# Patient Record
Sex: MALE | Race: WHITE | NOT HISPANIC OR LATINO | Employment: FULL TIME | URBAN - METROPOLITAN AREA
[De-identification: names, ages, dates, MRNs, and addresses within clinical notes are randomized per-mention and may not be internally consistent; named-entity substitution may affect disease eponyms.]

---

## 2018-09-28 ENCOUNTER — OFFICE VISIT (OUTPATIENT)
Dept: FAMILY MEDICINE CLINIC | Facility: CLINIC | Age: 63
End: 2018-09-28

## 2018-09-28 ENCOUNTER — TELEPHONE (OUTPATIENT)
Dept: FAMILY MEDICINE CLINIC | Facility: CLINIC | Age: 63
End: 2018-09-28

## 2018-09-28 VITALS
OXYGEN SATURATION: 99 % | BODY MASS INDEX: 26.59 KG/M2 | HEART RATE: 71 BPM | DIASTOLIC BLOOD PRESSURE: 90 MMHG | HEIGHT: 74 IN | SYSTOLIC BLOOD PRESSURE: 124 MMHG | WEIGHT: 207.2 LBS | TEMPERATURE: 97.7 F

## 2018-09-28 DIAGNOSIS — Z00.00 ENCOUNTER FOR PREVENTIVE HEALTH EXAMINATION: Primary | ICD-10-CM

## 2018-09-28 LAB
ALBUMIN SERPL-MCNC: 4.2 G/DL (ref 3.5–5.2)
ALBUMIN/GLOB SERPL: 1.5 G/DL
ALP SERPL-CCNC: 93 U/L (ref 39–117)
ALT SERPL-CCNC: 19 U/L (ref 1–41)
AST SERPL-CCNC: 19 U/L (ref 1–40)
BASOPHILS # BLD AUTO: 0.01 10*3/MM3 (ref 0–0.2)
BASOPHILS NFR BLD AUTO: 0.2 % (ref 0–1.5)
BILIRUB SERPL-MCNC: 0.7 MG/DL (ref 0.1–1.2)
BUN SERPL-MCNC: 14 MG/DL (ref 8–23)
BUN/CREAT SERPL: 15.2 (ref 7–25)
CALCIUM SERPL-MCNC: 9.5 MG/DL (ref 8.6–10.5)
CHLORIDE SERPL-SCNC: 104 MMOL/L (ref 98–107)
CHOLEST SERPL-MCNC: 204 MG/DL (ref 0–200)
CO2 SERPL-SCNC: 26 MMOL/L (ref 22–29)
CREAT SERPL-MCNC: 0.92 MG/DL (ref 0.76–1.27)
EOSINOPHIL # BLD AUTO: 0.05 10*3/MM3 (ref 0–0.7)
EOSINOPHIL NFR BLD AUTO: 1 % (ref 0.3–6.2)
ERYTHROCYTE [DISTWIDTH] IN BLOOD BY AUTOMATED COUNT: 13.6 % (ref 11.5–14.5)
GLOBULIN SER CALC-MCNC: 2.8 GM/DL
GLUCOSE SERPL-MCNC: 85 MG/DL (ref 65–99)
HCT VFR BLD AUTO: 48.6 % (ref 40.4–52.2)
HDLC SERPL-MCNC: 67 MG/DL (ref 40–60)
HGB BLD-MCNC: 15.7 G/DL (ref 13.7–17.6)
IMM GRANULOCYTES # BLD: 0 10*3/MM3 (ref 0–0.03)
IMM GRANULOCYTES NFR BLD: 0 % (ref 0–0.5)
LDLC SERPL CALC-MCNC: 118 MG/DL (ref 0–100)
LYMPHOCYTES # BLD AUTO: 1.33 10*3/MM3 (ref 0.9–4.8)
LYMPHOCYTES NFR BLD AUTO: 25.3 % (ref 19.6–45.3)
MCH RBC QN AUTO: 30.3 PG (ref 27–32.7)
MCHC RBC AUTO-ENTMCNC: 32.3 G/DL (ref 32.6–36.4)
MCV RBC AUTO: 93.8 FL (ref 79.8–96.2)
MONOCYTES # BLD AUTO: 0.37 10*3/MM3 (ref 0.2–1.2)
MONOCYTES NFR BLD AUTO: 7 % (ref 5–12)
NEUTROPHILS # BLD AUTO: 3.5 10*3/MM3 (ref 1.9–8.1)
NEUTROPHILS NFR BLD AUTO: 66.5 % (ref 42.7–76)
PLATELET # BLD AUTO: 215 10*3/MM3 (ref 140–500)
POTASSIUM SERPL-SCNC: 5 MMOL/L (ref 3.5–5.2)
PROT SERPL-MCNC: 7 G/DL (ref 6–8.5)
PSA SERPL-MCNC: 1.5 NG/ML (ref 0–4)
RBC # BLD AUTO: 5.18 10*6/MM3 (ref 4.6–6)
SODIUM SERPL-SCNC: 143 MMOL/L (ref 136–145)
TRIGL SERPL-MCNC: 97 MG/DL (ref 0–150)
VLDLC SERPL CALC-MCNC: 19.4 MG/DL (ref 5–40)
WBC # BLD AUTO: 5.26 10*3/MM3 (ref 4.5–10.7)

## 2018-09-28 PROCEDURE — 99396 PREV VISIT EST AGE 40-64: CPT | Performed by: INTERNAL MEDICINE

## 2018-09-28 PROCEDURE — 90471 IMMUNIZATION ADMIN: CPT | Performed by: INTERNAL MEDICINE

## 2018-09-28 PROCEDURE — 90686 IIV4 VACC NO PRSV 0.5 ML IM: CPT | Performed by: INTERNAL MEDICINE

## 2018-09-28 NOTE — TELEPHONE ENCOUNTER
----- Message from Nelson Mccoy MD sent at 9/28/2018 10:21 AM EDT -----  Please contact dr fuentes office for most recent colonoscopy

## 2018-10-03 ENCOUNTER — TELEPHONE (OUTPATIENT)
Dept: FAMILY MEDICINE CLINIC | Facility: CLINIC | Age: 63
End: 2018-10-03

## 2018-10-08 ENCOUNTER — HOSPITAL ENCOUNTER (OUTPATIENT)
Dept: GENERAL RADIOLOGY | Facility: HOSPITAL | Age: 63
Discharge: HOME | End: 2018-10-08
Attending: INTERNAL MEDICINE | Admitting: INTERNAL MEDICINE

## 2018-10-08 DIAGNOSIS — Z00.00 ENCOUNTER FOR PREVENTIVE HEALTH EXAMINATION: ICD-10-CM

## 2018-10-08 PROCEDURE — 71046 X-RAY EXAM CHEST 2 VIEWS: CPT

## 2018-10-10 ENCOUNTER — TELEPHONE (OUTPATIENT)
Dept: FAMILY MEDICINE CLINIC | Facility: CLINIC | Age: 63
End: 2018-10-10

## 2018-10-10 NOTE — TELEPHONE ENCOUNTER
I called pt to verify if he did see Dr Lazo for colonoscopy and he said he did on 5/13/2010.  Will call them back to get report.  He also wanted to sched the Hep B vaccine.

## 2018-10-19 ENCOUNTER — CLINICAL SUPPORT (OUTPATIENT)
Dept: FAMILY MEDICINE CLINIC | Facility: CLINIC | Age: 63
End: 2018-10-19

## 2018-10-19 PROCEDURE — 90471 IMMUNIZATION ADMIN: CPT | Performed by: INTERNAL MEDICINE

## 2018-10-19 PROCEDURE — 90746 HEPB VACCINE 3 DOSE ADULT IM: CPT | Performed by: INTERNAL MEDICINE

## 2018-10-22 ENCOUNTER — TELEPHONE (OUTPATIENT)
Dept: FAMILY MEDICINE CLINIC | Facility: CLINIC | Age: 63
End: 2018-10-22

## 2018-10-22 NOTE — TELEPHONE ENCOUNTER
Called Dr Lazo's office and left a message to get colonoscopy report from 5/13/2010.  Gave fax #. Left message.

## 2018-11-30 ENCOUNTER — OUTSIDE FACILITY SERVICE (OUTPATIENT)
Dept: SURGERY | Facility: CLINIC | Age: 63
End: 2018-11-30

## 2018-11-30 PROCEDURE — 45378 DIAGNOSTIC COLONOSCOPY: CPT | Performed by: SURGERY

## 2019-02-08 ENCOUNTER — TELEPHONE (OUTPATIENT)
Dept: FAMILY MEDICINE CLINIC | Facility: CLINIC | Age: 64
End: 2019-02-08

## 2019-02-08 NOTE — TELEPHONE ENCOUNTER
Spoke with patient, he is going to check calendar and call me back     ----- Message from Nelson Mccoy MD sent at 2/8/2019 12:55 PM EST -----    No he can get the second shot now and then the third in April    ----- Message -----  From: Susie Cabrera  Sent: 2/8/2019  12:33 PM  To: Nelson Mccoy MD    SINCE THE MISSED THE 2ND ONE DURING THAT TIME FRAME DOES HE HAVE TO START OVER OR CAN HE COME IN FOR THE 2ND ONE WITH NO ISSUES    ----- Message -----  From: Nelson Mccoy MD  Sent: 2/8/2019  12:28 PM  To: Susie Cabrera    He should have had the secon hep b one month after the first and the third at 6 months    ----- Message -----  From: Susie Cabrera  Sent: 2/8/2019  10:49 AM  To: Nelson Mccoy MD    Patient [phone]    First in series for Hep b done in October - when should he come back for the other two?

## 2019-02-14 ENCOUNTER — CLINICAL SUPPORT (OUTPATIENT)
Dept: FAMILY MEDICINE CLINIC | Facility: CLINIC | Age: 64
End: 2019-02-14

## 2019-02-14 DIAGNOSIS — Z23 IMMUNIZATION DUE: Primary | ICD-10-CM

## 2019-02-14 DIAGNOSIS — Z23 IMMUNIZATION DUE: ICD-10-CM

## 2019-02-14 DIAGNOSIS — Z11.59 ENCOUNTER FOR HEPATITIS C SCREENING TEST FOR LOW RISK PATIENT: Primary | ICD-10-CM

## 2019-02-14 PROCEDURE — 90746 HEPB VACCINE 3 DOSE ADULT IM: CPT | Performed by: INTERNAL MEDICINE

## 2019-02-14 PROCEDURE — 90472 IMMUNIZATION ADMIN EACH ADD: CPT | Performed by: INTERNAL MEDICINE

## 2019-02-14 PROCEDURE — 90471 IMMUNIZATION ADMIN: CPT | Performed by: INTERNAL MEDICINE

## 2019-02-14 PROCEDURE — 90715 TDAP VACCINE 7 YRS/> IM: CPT | Performed by: INTERNAL MEDICINE

## 2019-02-15 LAB — HCV AB S/CO SERPL IA: <0.1 S/CO RATIO (ref 0–0.9)

## 2019-02-18 ENCOUNTER — TELEPHONE (OUTPATIENT)
Dept: FAMILY MEDICINE CLINIC | Facility: CLINIC | Age: 64
End: 2019-02-18

## 2019-02-18 NOTE — TELEPHONE ENCOUNTER
Notified patient of test results.    ----- Message from Nelson Mccoy MD sent at 2/15/2019  4:56 PM EST -----  Please notify the patient that his hepatitis C antibody test is negative

## 2019-04-03 ENCOUNTER — CLINICAL SUPPORT (OUTPATIENT)
Dept: FAMILY MEDICINE CLINIC | Facility: CLINIC | Age: 64
End: 2019-04-03

## 2019-04-03 DIAGNOSIS — Z23 IMMUNIZATION DUE: Primary | ICD-10-CM

## 2019-04-03 PROCEDURE — 90746 HEPB VACCINE 3 DOSE ADULT IM: CPT | Performed by: INTERNAL MEDICINE

## 2019-04-03 PROCEDURE — 90471 IMMUNIZATION ADMIN: CPT | Performed by: INTERNAL MEDICINE

## 2019-11-15 ENCOUNTER — OFFICE VISIT (OUTPATIENT)
Dept: FAMILY MEDICINE CLINIC | Facility: CLINIC | Age: 64
End: 2019-11-15

## 2019-11-15 VITALS
TEMPERATURE: 97.5 F | DIASTOLIC BLOOD PRESSURE: 80 MMHG | SYSTOLIC BLOOD PRESSURE: 120 MMHG | BODY MASS INDEX: 25.8 KG/M2 | WEIGHT: 201 LBS | HEART RATE: 73 BPM | OXYGEN SATURATION: 99 % | HEIGHT: 74 IN

## 2019-11-15 DIAGNOSIS — Z23 NEED FOR IMMUNIZATION AGAINST INFLUENZA: ICD-10-CM

## 2019-11-15 DIAGNOSIS — Z00.00 ANNUAL PHYSICAL EXAM: Primary | ICD-10-CM

## 2019-11-15 PROCEDURE — 90674 CCIIV4 VAC NO PRSV 0.5 ML IM: CPT | Performed by: INTERNAL MEDICINE

## 2019-11-15 PROCEDURE — 99396 PREV VISIT EST AGE 40-64: CPT | Performed by: INTERNAL MEDICINE

## 2019-11-15 PROCEDURE — 90471 IMMUNIZATION ADMIN: CPT | Performed by: INTERNAL MEDICINE

## 2019-11-15 NOTE — PROGRESS NOTES
Subjective Chief complaint is annual physical exam  Hector Kam is a 64 y.o. male.     History of Present Illness   Jamil is here today for his annual physical exam.  He basically has been feeling well.  He has no chronic medical diseases.  He is on no routine medications.  He is up-to-date on his colonoscopy.  He does have some cervical disc disease and occasionally gets some tingling on the left side of his face.  The following portions of the patient's history were reviewed and updated as appropriate: allergies, current medications, past family history, past medical history, past social history, past surgical history and problem list.    Review of Systems   Constitutional: Negative.    HENT: Negative.    Eyes: Negative.    Respiratory: Negative.    Cardiovascular:        He occasionally gets some swelling in his feet by the end of the day.   Gastrointestinal: Negative.    Genitourinary: Positive for nocturia.   Musculoskeletal: Positive for arthralgias.        He has some arthritic symptoms in his thumbs.   Neurological: Negative.    Hematological: Negative.    Psychiatric/Behavioral: Negative.        Objective   Physical Exam   Constitutional: He appears well-developed and well-nourished.   HENT:   Head: Normocephalic and atraumatic.   Right Ear: External ear normal.   Left Ear: External ear normal.   Nose: Nose normal.   Mouth/Throat: Oropharynx is clear and moist.   Eyes: Conjunctivae and EOM are normal. Pupils are equal, round, and reactive to light. No scleral icterus.   Neck: Neck supple. No JVD present. No tracheal deviation present. No thyromegaly present.   Cardiovascular: Normal rate, regular rhythm, normal heart sounds and intact distal pulses. Exam reveals no gallop and no friction rub.   No murmur heard.  Pulmonary/Chest: Effort normal and breath sounds normal. No respiratory distress. He has no wheezes. He has no rales.   Abdominal: Soft. Bowel sounds are normal. He exhibits no distension and no  mass. There is no tenderness. There is no rebound and no guarding. No hernia.   Genitourinary: Rectum normal.   Genitourinary Comments: Prostate is slightly enlarged but there are no firmness or nodules.  There are some external hemorrhoid remnants.   Musculoskeletal: Normal range of motion.   Lymphadenopathy:     He has no cervical adenopathy.   Neurological: He is alert. He has normal reflexes. No cranial nerve deficit. Coordination normal.   Skin: Skin is warm and dry.   Psychiatric: He has a normal mood and affect. His behavior is normal.   Nursing note and vitals reviewed.        Assessment/Plan   Hector was seen today for annual exam.    Diagnoses and all orders for this visit:    Annual physical exam  -     CBC & Differential  -     Comprehensive Metabolic Panel  -     Lipid Panel  -     PSA Screen    Need for immunization against influenza    Jamil is here today for his annual exam.  All in all he seems to be doing well.  He is up-to-date on most screening exams.  He is a former smoker and we did  him regarding possible low-dose CT screenings.  He is going to check with his insurance company about the coverage on that.  Otherwise I do not really see much need to do further plain x-rays to follow the calcified pulmonary nodule.

## 2019-11-16 LAB
ALBUMIN SERPL-MCNC: 4.4 G/DL (ref 3.5–5.2)
ALBUMIN/GLOB SERPL: 1.8 G/DL
ALP SERPL-CCNC: 89 U/L (ref 39–117)
ALT SERPL-CCNC: 17 U/L (ref 1–41)
AST SERPL-CCNC: 22 U/L (ref 1–40)
BASOPHILS # BLD AUTO: 0.03 10*3/MM3 (ref 0–0.2)
BASOPHILS NFR BLD AUTO: 0.7 % (ref 0–1.5)
BILIRUB SERPL-MCNC: 0.6 MG/DL (ref 0.2–1.2)
BUN SERPL-MCNC: 16 MG/DL (ref 8–23)
BUN/CREAT SERPL: 17.4 (ref 7–25)
CALCIUM SERPL-MCNC: 9.3 MG/DL (ref 8.6–10.5)
CHLORIDE SERPL-SCNC: 103 MMOL/L (ref 98–107)
CHOLEST SERPL-MCNC: 220 MG/DL (ref 0–200)
CO2 SERPL-SCNC: 26.7 MMOL/L (ref 22–29)
CREAT SERPL-MCNC: 0.92 MG/DL (ref 0.76–1.27)
EOSINOPHIL # BLD AUTO: 0.04 10*3/MM3 (ref 0–0.4)
EOSINOPHIL NFR BLD AUTO: 0.9 % (ref 0.3–6.2)
ERYTHROCYTE [DISTWIDTH] IN BLOOD BY AUTOMATED COUNT: 13.4 % (ref 12.3–15.4)
GLOBULIN SER CALC-MCNC: 2.5 GM/DL
GLUCOSE SERPL-MCNC: 87 MG/DL (ref 65–99)
HCT VFR BLD AUTO: 43.5 % (ref 37.5–51)
HDLC SERPL-MCNC: 66 MG/DL (ref 40–60)
HGB BLD-MCNC: 14.2 G/DL (ref 13–17.7)
IMM GRANULOCYTES # BLD AUTO: 0.01 10*3/MM3 (ref 0–0.05)
IMM GRANULOCYTES NFR BLD AUTO: 0.2 % (ref 0–0.5)
LDLC SERPL CALC-MCNC: 138 MG/DL (ref 0–100)
LYMPHOCYTES # BLD AUTO: 0.9 10*3/MM3 (ref 0.7–3.1)
LYMPHOCYTES NFR BLD AUTO: 19.7 % (ref 19.6–45.3)
MCH RBC QN AUTO: 30.2 PG (ref 26.6–33)
MCHC RBC AUTO-ENTMCNC: 32.6 G/DL (ref 31.5–35.7)
MCV RBC AUTO: 92.6 FL (ref 79–97)
MONOCYTES # BLD AUTO: 0.49 10*3/MM3 (ref 0.1–0.9)
MONOCYTES NFR BLD AUTO: 10.7 % (ref 5–12)
NEUTROPHILS # BLD AUTO: 3.09 10*3/MM3 (ref 1.7–7)
NEUTROPHILS NFR BLD AUTO: 67.8 % (ref 42.7–76)
NRBC BLD AUTO-RTO: 0 /100 WBC (ref 0–0.2)
PLATELET # BLD AUTO: 276 10*3/MM3 (ref 140–450)
POTASSIUM SERPL-SCNC: 4.7 MMOL/L (ref 3.5–5.2)
PROT SERPL-MCNC: 6.9 G/DL (ref 6–8.5)
PSA SERPL-MCNC: 1.34 NG/ML (ref 0–4)
RBC # BLD AUTO: 4.7 10*6/MM3 (ref 4.14–5.8)
SODIUM SERPL-SCNC: 142 MMOL/L (ref 136–145)
TRIGL SERPL-MCNC: 78 MG/DL (ref 0–150)
VLDLC SERPL CALC-MCNC: 15.6 MG/DL
WBC # BLD AUTO: 4.56 10*3/MM3 (ref 3.4–10.8)

## 2020-08-10 ENCOUNTER — TELEPHONE (OUTPATIENT)
Dept: FAMILY MEDICINE CLINIC | Facility: CLINIC | Age: 65
End: 2020-08-10

## 2020-08-10 NOTE — TELEPHONE ENCOUNTER
HUB UNABLE TO WARM TRANSFER    PATIENT STATED HE RECEIVED A LETTER THAT THE PNEUMONIA AND FLU VACCINE SHOTS ARE AVAILABLE.  PATIENT WANTS TO SCHEDULE AN APPOINTMENT FOR THESE VACCINES.  PLEASE ADVISE.    PATIENT CALL BACK #: [phone]

## 2020-09-15 ENCOUNTER — HOSPITAL ENCOUNTER (OUTPATIENT)
Facility: HOSPITAL | Age: 65
Setting detail: OBSERVATION
Discharge: HOME | End: 2020-09-17
Attending: EMERGENCY MEDICINE | Admitting: HOSPITALIST

## 2020-09-15 ENCOUNTER — APPOINTMENT (OUTPATIENT)
Dept: GENERAL RADIOLOGY | Facility: HOSPITAL | Age: 65
End: 2020-09-15

## 2020-09-15 ENCOUNTER — APPOINTMENT (OUTPATIENT)
Dept: CT IMAGING | Facility: HOSPITAL | Age: 65
End: 2020-09-15

## 2020-09-15 DIAGNOSIS — I26.99 ACUTE PULMONARY EMBOLISM, UNSPECIFIED PULMONARY EMBOLISM TYPE, UNSPECIFIED WHETHER ACUTE COR PULMONALE PRESENT (HCC): Primary | ICD-10-CM

## 2020-09-15 PROBLEM — I82.409 ACUTE DEEP VEIN THROMBOSIS (DVT) OF LOWER EXTREMITY: Status: ACTIVE | Noted: 2020-09-15

## 2020-09-15 LAB
ALBUMIN SERPL-MCNC: 4 G/DL (ref 3.5–5.2)
ALBUMIN/GLOB SERPL: 1.2 G/DL
ALP SERPL-CCNC: 120 U/L (ref 39–117)
ALT SERPL W P-5'-P-CCNC: 22 U/L (ref 1–41)
ANION GAP SERPL CALCULATED.3IONS-SCNC: 10.3 MMOL/L (ref 5–15)
APTT PPP: 35.1 SECONDS (ref 22.7–35.4)
AST SERPL-CCNC: 17 U/L (ref 1–40)
B PARAPERT DNA SPEC QL NAA+PROBE: NOT DETECTED
B PERT DNA SPEC QL NAA+PROBE: NOT DETECTED
BASOPHILS # BLD AUTO: 0.03 10*3/MM3 (ref 0–0.2)
BASOPHILS NFR BLD AUTO: 0.3 % (ref 0–1.5)
BILIRUB SERPL-MCNC: 0.6 MG/DL (ref 0–1.2)
BUN SERPL-MCNC: 10 MG/DL (ref 8–23)
BUN/CREAT SERPL: 11.4 (ref 7–25)
C PNEUM DNA NPH QL NAA+NON-PROBE: NOT DETECTED
CALCIUM SPEC-SCNC: 8.9 MG/DL (ref 8.6–10.5)
CHLORIDE SERPL-SCNC: 102 MMOL/L (ref 98–107)
CO2 SERPL-SCNC: 25.7 MMOL/L (ref 22–29)
CREAT SERPL-MCNC: 0.88 MG/DL (ref 0.76–1.27)
DEPRECATED RDW RBC AUTO: 41.3 FL (ref 37–54)
EOSINOPHIL # BLD AUTO: 0.09 10*3/MM3 (ref 0–0.4)
EOSINOPHIL NFR BLD AUTO: 0.9 % (ref 0.3–6.2)
ERYTHROCYTE [DISTWIDTH] IN BLOOD BY AUTOMATED COUNT: 12.7 % (ref 12.3–15.4)
FLUAV H1 2009 PAND RNA NPH QL NAA+PROBE: NOT DETECTED
FLUAV H1 HA GENE NPH QL NAA+PROBE: NOT DETECTED
FLUAV H3 RNA NPH QL NAA+PROBE: NOT DETECTED
FLUAV SUBTYP SPEC NAA+PROBE: NOT DETECTED
FLUBV RNA ISLT QL NAA+PROBE: NOT DETECTED
GFR SERPL CREATININE-BSD FRML MDRD: 87 ML/MIN/1.73
GLOBULIN UR ELPH-MCNC: 3.4 GM/DL
GLUCOSE SERPL-MCNC: 98 MG/DL (ref 65–99)
HADV DNA SPEC NAA+PROBE: NOT DETECTED
HCOV 229E RNA SPEC QL NAA+PROBE: NOT DETECTED
HCOV HKU1 RNA SPEC QL NAA+PROBE: NOT DETECTED
HCOV NL63 RNA SPEC QL NAA+PROBE: NOT DETECTED
HCOV OC43 RNA SPEC QL NAA+PROBE: NOT DETECTED
HCT VFR BLD AUTO: 41.5 % (ref 37.5–51)
HGB BLD-MCNC: 14.5 G/DL (ref 13–17.7)
HMPV RNA NPH QL NAA+NON-PROBE: NOT DETECTED
HPIV1 RNA SPEC QL NAA+PROBE: NOT DETECTED
HPIV2 RNA SPEC QL NAA+PROBE: NOT DETECTED
HPIV3 RNA NPH QL NAA+PROBE: NOT DETECTED
HPIV4 P GENE NPH QL NAA+PROBE: NOT DETECTED
IMM GRANULOCYTES # BLD AUTO: 0.04 10*3/MM3 (ref 0–0.05)
IMM GRANULOCYTES NFR BLD AUTO: 0.4 % (ref 0–0.5)
INR PPP: 1.16 (ref 0.9–1.1)
LYMPHOCYTES # BLD AUTO: 0.95 10*3/MM3 (ref 0.7–3.1)
LYMPHOCYTES NFR BLD AUTO: 9.5 % (ref 19.6–45.3)
M PNEUMO IGG SER IA-ACNC: NOT DETECTED
MCH RBC QN AUTO: 31.7 PG (ref 26.6–33)
MCHC RBC AUTO-ENTMCNC: 34.9 G/DL (ref 31.5–35.7)
MCV RBC AUTO: 90.8 FL (ref 79–97)
MONOCYTES # BLD AUTO: 1.28 10*3/MM3 (ref 0.1–0.9)
MONOCYTES NFR BLD AUTO: 12.8 % (ref 5–12)
NEUTROPHILS NFR BLD AUTO: 7.63 10*3/MM3 (ref 1.7–7)
NEUTROPHILS NFR BLD AUTO: 76.1 % (ref 42.7–76)
NRBC BLD AUTO-RTO: 0 /100 WBC (ref 0–0.2)
NT-PROBNP SERPL-MCNC: 135.6 PG/ML (ref 0–900)
PLATELET # BLD AUTO: 291 10*3/MM3 (ref 140–450)
PMV BLD AUTO: 10.1 FL (ref 6–12)
POTASSIUM SERPL-SCNC: 3.9 MMOL/L (ref 3.5–5.2)
PROT SERPL-MCNC: 7.4 G/DL (ref 6–8.5)
PROTHROMBIN TIME: 14.7 SECONDS (ref 11.7–14.2)
RBC # BLD AUTO: 4.57 10*6/MM3 (ref 4.14–5.8)
RHINOVIRUS RNA SPEC NAA+PROBE: NOT DETECTED
RSV RNA NPH QL NAA+NON-PROBE: NOT DETECTED
SARS-COV-2 RNA NPH QL NAA+NON-PROBE: NOT DETECTED
SODIUM SERPL-SCNC: 138 MMOL/L (ref 136–145)
TROPONIN T SERPL-MCNC: <0.01 NG/ML (ref 0–0.03)
WBC # BLD AUTO: 10.02 10*3/MM3 (ref 3.4–10.8)

## 2020-09-15 PROCEDURE — 93005 ELECTROCARDIOGRAM TRACING: CPT | Performed by: EMERGENCY MEDICINE

## 2020-09-15 PROCEDURE — 85610 PROTHROMBIN TIME: CPT | Performed by: EMERGENCY MEDICINE

## 2020-09-15 PROCEDURE — 25010000002 MORPHINE PER 10 MG: Performed by: INTERNAL MEDICINE

## 2020-09-15 PROCEDURE — 0202U NFCT DS 22 TRGT SARS-COV-2: CPT | Performed by: NURSE PRACTITIONER

## 2020-09-15 PROCEDURE — 96366 THER/PROPH/DIAG IV INF ADDON: CPT

## 2020-09-15 PROCEDURE — G0378 HOSPITAL OBSERVATION PER HR: HCPCS

## 2020-09-15 PROCEDURE — 93010 ELECTROCARDIOGRAM REPORT: CPT | Performed by: INTERNAL MEDICINE

## 2020-09-15 PROCEDURE — 25010000002 HEPARIN (PORCINE) PER 1000 UNITS: Performed by: NURSE PRACTITIONER

## 2020-09-15 PROCEDURE — 96375 TX/PRO/DX INJ NEW DRUG ADDON: CPT

## 2020-09-15 PROCEDURE — 83880 ASSAY OF NATRIURETIC PEPTIDE: CPT | Performed by: EMERGENCY MEDICINE

## 2020-09-15 PROCEDURE — 0 IOPAMIDOL PER 1 ML: Performed by: EMERGENCY MEDICINE

## 2020-09-15 PROCEDURE — 99285 EMERGENCY DEPT VISIT HI MDM: CPT

## 2020-09-15 PROCEDURE — 96365 THER/PROPH/DIAG IV INF INIT: CPT

## 2020-09-15 PROCEDURE — 96376 TX/PRO/DX INJ SAME DRUG ADON: CPT

## 2020-09-15 PROCEDURE — 85025 COMPLETE CBC W/AUTO DIFF WBC: CPT | Performed by: EMERGENCY MEDICINE

## 2020-09-15 PROCEDURE — 71275 CT ANGIOGRAPHY CHEST: CPT

## 2020-09-15 PROCEDURE — 80053 COMPREHEN METABOLIC PANEL: CPT | Performed by: EMERGENCY MEDICINE

## 2020-09-15 PROCEDURE — C9803 HOPD COVID-19 SPEC COLLECT: HCPCS

## 2020-09-15 PROCEDURE — 93005 ELECTROCARDIOGRAM TRACING: CPT

## 2020-09-15 PROCEDURE — 71045 X-RAY EXAM CHEST 1 VIEW: CPT

## 2020-09-15 PROCEDURE — 85730 THROMBOPLASTIN TIME PARTIAL: CPT | Performed by: NURSE PRACTITIONER

## 2020-09-15 PROCEDURE — 84484 ASSAY OF TROPONIN QUANT: CPT | Performed by: EMERGENCY MEDICINE

## 2020-09-15 RX ORDER — ONDANSETRON 2 MG/ML
4 INJECTION INTRAMUSCULAR; INTRAVENOUS EVERY 6 HOURS PRN
Status: DISCONTINUED | OUTPATIENT
Start: 2020-09-15 | End: 2020-09-17 | Stop reason: HOSPADM

## 2020-09-15 RX ORDER — SODIUM CHLORIDE 0.9 % (FLUSH) 0.9 %
10 SYRINGE (ML) INJECTION AS NEEDED
Status: DISCONTINUED | OUTPATIENT
Start: 2020-09-15 | End: 2020-09-17 | Stop reason: HOSPADM

## 2020-09-15 RX ORDER — MORPHINE SULFATE 2 MG/ML
4 INJECTION, SOLUTION INTRAMUSCULAR; INTRAVENOUS
Status: DISCONTINUED | OUTPATIENT
Start: 2020-09-15 | End: 2020-09-16

## 2020-09-15 RX ORDER — HEPARIN SODIUM 5000 [USP'U]/ML
40-80 INJECTION, SOLUTION INTRAVENOUS; SUBCUTANEOUS EVERY 6 HOURS PRN
Status: DISCONTINUED | OUTPATIENT
Start: 2020-09-15 | End: 2020-09-16

## 2020-09-15 RX ORDER — SODIUM CHLORIDE 0.9 % (FLUSH) 0.9 %
10 SYRINGE (ML) INJECTION EVERY 12 HOURS SCHEDULED
Status: DISCONTINUED | OUTPATIENT
Start: 2020-09-15 | End: 2020-09-17 | Stop reason: HOSPADM

## 2020-09-15 RX ORDER — ACETAMINOPHEN 325 MG/1
650 TABLET ORAL EVERY 4 HOURS PRN
Status: DISCONTINUED | OUTPATIENT
Start: 2020-09-15 | End: 2020-09-17 | Stop reason: HOSPADM

## 2020-09-15 RX ORDER — NITROGLYCERIN 0.4 MG/1
0.4 TABLET SUBLINGUAL
Status: DISCONTINUED | OUTPATIENT
Start: 2020-09-15 | End: 2020-09-17 | Stop reason: HOSPADM

## 2020-09-15 RX ORDER — ACETAMINOPHEN 160 MG/5ML
650 SOLUTION ORAL EVERY 4 HOURS PRN
Status: DISCONTINUED | OUTPATIENT
Start: 2020-09-15 | End: 2020-09-16

## 2020-09-15 RX ORDER — SODIUM CHLORIDE 9 MG/ML
50 INJECTION, SOLUTION INTRAVENOUS CONTINUOUS
Status: DISCONTINUED | OUTPATIENT
Start: 2020-09-15 | End: 2020-09-17 | Stop reason: HOSPADM

## 2020-09-15 RX ORDER — HEPARIN SODIUM 5000 [USP'U]/ML
80 INJECTION, SOLUTION INTRAVENOUS; SUBCUTANEOUS ONCE
Status: COMPLETED | OUTPATIENT
Start: 2020-09-15 | End: 2020-09-15

## 2020-09-15 RX ORDER — ACETAMINOPHEN 650 MG/1
650 SUPPOSITORY RECTAL EVERY 4 HOURS PRN
Status: DISCONTINUED | OUTPATIENT
Start: 2020-09-15 | End: 2020-09-16

## 2020-09-15 RX ORDER — HEPARIN SODIUM 10000 [USP'U]/100ML
15.7 INJECTION, SOLUTION INTRAVENOUS
Status: DISCONTINUED | OUTPATIENT
Start: 2020-09-15 | End: 2020-09-16

## 2020-09-15 RX ORDER — MORPHINE SULFATE 2 MG/ML
INJECTION, SOLUTION INTRAMUSCULAR; INTRAVENOUS
Status: DISPENSED
Start: 2020-09-15 | End: 2020-09-16

## 2020-09-15 RX ADMIN — IOPAMIDOL 95 ML: 755 INJECTION, SOLUTION INTRAVENOUS at 19:55

## 2020-09-15 RX ADMIN — MORPHINE SULFATE 4 MG: 2 INJECTION, SOLUTION INTRAMUSCULAR; INTRAVENOUS at 22:47

## 2020-09-15 RX ADMIN — SODIUM CHLORIDE 100 ML/HR: 9 INJECTION, SOLUTION INTRAVENOUS at 22:49

## 2020-09-15 RX ADMIN — HEPARIN SODIUM 15.7 UNITS/KG/HR: 10000 INJECTION, SOLUTION INTRAVENOUS at 20:26

## 2020-09-15 RX ADMIN — SODIUM CHLORIDE, PRESERVATIVE FREE 10 ML: 5 INJECTION INTRAVENOUS at 22:42

## 2020-09-15 RX ADMIN — HEPARIN SODIUM 7600 UNITS: 5000 INJECTION INTRAVENOUS; SUBCUTANEOUS at 20:25

## 2020-09-15 NOTE — ED NOTES
Pt to ED via [facility]. Pt states seen by [facility] today. Pt states SOB and CP that started last night. Pt states knots on left leg x 10 days. Pt denies blood thinners. Pt states calf tenderness in left side.     Pt with mask in place at triage. Triage staff in appropriate PPE.       Marion Crawford, RN  09/15/20 2638

## 2020-09-15 NOTE — ED NOTES
Patient was placed in face mask in first look.  Patient was wearing a face mask throughout our encounter.  I wore gown, mask, face shield throughout the encounter.  Hand hygiene was performed before and after patient encounter.  Isolation maintained.       Gustabo Robert RN  09/15/20 1468

## 2020-09-15 NOTE — ED NOTES
Patient was placed in face mask in first look.  Patient was wearing a face mask throughout our encounter.  I wore gown, mask, face shield throughout the encounter.  Hand hygiene was performed before and after patient encounter.  Isolation maintained.       Gustabo Robert RN  09/15/20 9748

## 2020-09-16 ENCOUNTER — APPOINTMENT (OUTPATIENT)
Dept: CARDIOLOGY | Facility: HOSPITAL | Age: 65
End: 2020-09-16

## 2020-09-16 LAB
ANION GAP SERPL CALCULATED.3IONS-SCNC: 10.6 MMOL/L (ref 5–15)
AORTIC DIMENSIONLESS INDEX: 0.9 (DI)
APTT PPP: 99.9 SECONDS (ref 22.7–35.4)
BASOPHILS # BLD AUTO: 0.02 10*3/MM3 (ref 0–0.2)
BASOPHILS NFR BLD AUTO: 0.2 % (ref 0–1.5)
BH CV ECHO MEAS - AO MAX PG (FULL): 1.3 MMHG
BH CV ECHO MEAS - AO MAX PG: 6 MMHG
BH CV ECHO MEAS - AO MEAN PG (FULL): 0 MMHG
BH CV ECHO MEAS - AO MEAN PG: 3 MMHG
BH CV ECHO MEAS - AO ROOT AREA (BSA CORRECTED): 2
BH CV ECHO MEAS - AO ROOT AREA: 15.2 CM^2
BH CV ECHO MEAS - AO ROOT DIAM: 4.4 CM
BH CV ECHO MEAS - AO V2 MAX: 122 CM/SEC
BH CV ECHO MEAS - AO V2 MEAN: 70.9 CM/SEC
BH CV ECHO MEAS - AO V2 VTI: 19.9 CM
BH CV ECHO MEAS - ASC AORTA: 3.7 CM
BH CV ECHO MEAS - AVA(I,A): 4.1 CM^2
BH CV ECHO MEAS - AVA(I,D): 4.1 CM^2
BH CV ECHO MEAS - AVA(V,A): 3.7 CM^2
BH CV ECHO MEAS - AVA(V,D): 3.7 CM^2
BH CV ECHO MEAS - BSA(HAYCOCK): 2.2 M^2
BH CV ECHO MEAS - BSA: 2.2 M^2
BH CV ECHO MEAS - BZI_BMI: 27.7 KILOGRAMS/M^2
BH CV ECHO MEAS - BZI_METRIC_HEIGHT: 185.4 CM
BH CV ECHO MEAS - BZI_METRIC_WEIGHT: 95.3 KG
BH CV ECHO MEAS - EDV(CUBED): 157.5 ML
BH CV ECHO MEAS - EDV(MOD-SP2): 120 ML
BH CV ECHO MEAS - EDV(MOD-SP4): 139 ML
BH CV ECHO MEAS - EDV(TEICH): 141.3 ML
BH CV ECHO MEAS - EF(CUBED): 81.1 %
BH CV ECHO MEAS - EF(MOD-BP): 58 %
BH CV ECHO MEAS - EF(MOD-SP2): 60.8 %
BH CV ECHO MEAS - EF(MOD-SP4): 56.8 %
BH CV ECHO MEAS - EF(TEICH): 73.2 %
BH CV ECHO MEAS - ESV(CUBED): 29.8 ML
BH CV ECHO MEAS - ESV(MOD-SP2): 47 ML
BH CV ECHO MEAS - ESV(MOD-SP4): 60 ML
BH CV ECHO MEAS - ESV(TEICH): 37.9 ML
BH CV ECHO MEAS - FS: 42.6 %
BH CV ECHO MEAS - IVS/LVPW: 0.69
BH CV ECHO MEAS - IVSD: 0.9 CM
BH CV ECHO MEAS - LA DIMENSION: 3.5 CM
BH CV ECHO MEAS - LA/AO: 0.8
BH CV ECHO MEAS - LAT PEAK E' VEL: 10 CM/SEC
BH CV ECHO MEAS - LV DIASTOLIC VOL/BSA (35-75): 63.3 ML/M^2
BH CV ECHO MEAS - LV MASS(C)D: 234.8 GRAMS
BH CV ECHO MEAS - LV MASS(C)DI: 106.9 GRAMS/M^2
BH CV ECHO MEAS - LV MAX PG: 4.7 MMHG
BH CV ECHO MEAS - LV MEAN PG: 3 MMHG
BH CV ECHO MEAS - LV SYSTOLIC VOL/BSA (12-30): 27.3 ML/M^2
BH CV ECHO MEAS - LV V1 MAX: 108 CM/SEC
BH CV ECHO MEAS - LV V1 MEAN: 71 CM/SEC
BH CV ECHO MEAS - LV V1 VTI: 19.7 CM
BH CV ECHO MEAS - LVIDD: 5.4 CM
BH CV ECHO MEAS - LVIDS: 3.1 CM
BH CV ECHO MEAS - LVLD AP2: 9.1 CM
BH CV ECHO MEAS - LVLD AP4: 9.2 CM
BH CV ECHO MEAS - LVLS AP2: 7.1 CM
BH CV ECHO MEAS - LVLS AP4: 7.5 CM
BH CV ECHO MEAS - LVOT AREA (M): 4.2 CM^2
BH CV ECHO MEAS - LVOT AREA: 4.2 CM^2
BH CV ECHO MEAS - LVOT DIAM: 2.3 CM
BH CV ECHO MEAS - LVPWD: 1.3 CM
BH CV ECHO MEAS - MED PEAK E' VEL: 6.96 CM/SEC
BH CV ECHO MEAS - MV A MAX VEL: 77.5 CM/SEC
BH CV ECHO MEAS - MV DEC SLOPE: 187 CM/SEC2
BH CV ECHO MEAS - MV DEC TIME: 187 SEC
BH CV ECHO MEAS - MV E MAX VEL: 59.2 CM/SEC
BH CV ECHO MEAS - MV E/A: 0.76
BH CV ECHO MEAS - PA ACC SLOPE: 1024 CM/SEC^2
BH CV ECHO MEAS - PA ACC TIME: 0.08 SEC
BH CV ECHO MEAS - PA MAX PG (FULL): -0.02 MMHG
BH CV ECHO MEAS - PA MAX PG: 2.9 MMHG
BH CV ECHO MEAS - PA PR(ACCEL): 42.6 MMHG
BH CV ECHO MEAS - PA V2 MAX: 85.4 CM/SEC
BH CV ECHO MEAS - PVA(V,A): 2.8 CM^2
BH CV ECHO MEAS - PVA(V,D): 2.8 CM^2
BH CV ECHO MEAS - QP/QS: 0.52
BH CV ECHO MEAS - RAP SYSTOLE: 8 MMHG
BH CV ECHO MEAS - RV MAX PG: 2.9 MMHG
BH CV ECHO MEAS - RV MEAN PG: 1 MMHG
BH CV ECHO MEAS - RV V1 MAX: 85.7 CM/SEC
BH CV ECHO MEAS - RV V1 MEAN: 53.4 CM/SEC
BH CV ECHO MEAS - RV V1 VTI: 15 CM
BH CV ECHO MEAS - RVOT AREA: 2.8 CM^2
BH CV ECHO MEAS - RVOT DIAM: 1.9 CM
BH CV ECHO MEAS - SI(AO): 137.7 ML/M^2
BH CV ECHO MEAS - SI(CUBED): 58.1 ML/M^2
BH CV ECHO MEAS - SI(LVOT): 37.3 ML/M^2
BH CV ECHO MEAS - SI(MOD-SP2): 33.2 ML/M^2
BH CV ECHO MEAS - SI(MOD-SP4): 36 ML/M^2
BH CV ECHO MEAS - SI(TEICH): 47.1 ML/M^2
BH CV ECHO MEAS - SV(AO): 302.6 ML
BH CV ECHO MEAS - SV(CUBED): 127.7 ML
BH CV ECHO MEAS - SV(LVOT): 81.8 ML
BH CV ECHO MEAS - SV(MOD-SP2): 73 ML
BH CV ECHO MEAS - SV(MOD-SP4): 79 ML
BH CV ECHO MEAS - SV(RVOT): 42.5 ML
BH CV ECHO MEAS - SV(TEICH): 103.4 ML
BH CV ECHO MEAS - TAPSE (>1.6): 2.2 CM2
BH CV ECHO MEASUREMENTS AVERAGE E/E' RATIO: 6.98
BH CV LOW VAS LEFT GREATER SAPH AK VESSEL: 1
BH CV LOW VAS LEFT GREATER SAPH BK VESSEL: 1
BH CV LOW VAS LEFT PERONEAL VESSEL: 1
BH CV LOW VAS LEFT SAPHENOFEMORAL JUNCTION SPONT: 1
BH CV LOW VAS LEFT SOLEAL VESSEL: 1
BH CV LOW VAS LEFT VARICOSITY AK VESSEL: 1
BH CV LOW VAS LEFT VARICOSITY BK VESSEL: 1
BH CV LOWER VASCULAR LEFT COMMON FEMORAL AUGMENT: NORMAL
BH CV LOWER VASCULAR LEFT COMMON FEMORAL COMPETENT: NORMAL
BH CV LOWER VASCULAR LEFT COMMON FEMORAL COMPRESS: NORMAL
BH CV LOWER VASCULAR LEFT COMMON FEMORAL PHASIC: NORMAL
BH CV LOWER VASCULAR LEFT COMMON FEMORAL SPONT: NORMAL
BH CV LOWER VASCULAR LEFT DISTAL FEMORAL COMPRESS: NORMAL
BH CV LOWER VASCULAR LEFT GASTRONEMIUS COMPRESS: NORMAL
BH CV LOWER VASCULAR LEFT GREATER SAPH AK COMPRESS: NORMAL
BH CV LOWER VASCULAR LEFT GREATER SAPH AK THROMBUS: NORMAL
BH CV LOWER VASCULAR LEFT GREATER SAPH BK COMPRESS: NORMAL
BH CV LOWER VASCULAR LEFT GREATER SAPH BK THROMBUS: NORMAL
BH CV LOWER VASCULAR LEFT LESSER SAPH COMPRESS: NORMAL
BH CV LOWER VASCULAR LEFT MID FEMORAL AUGMENT: NORMAL
BH CV LOWER VASCULAR LEFT MID FEMORAL COMPETENT: NORMAL
BH CV LOWER VASCULAR LEFT MID FEMORAL COMPRESS: NORMAL
BH CV LOWER VASCULAR LEFT MID FEMORAL PHASIC: NORMAL
BH CV LOWER VASCULAR LEFT MID FEMORAL SPONT: NORMAL
BH CV LOWER VASCULAR LEFT PERFORATOR COMPRESS: NORMAL
BH CV LOWER VASCULAR LEFT PERFORATOR THROMBUS: NORMAL
BH CV LOWER VASCULAR LEFT PERFORATOR VESSEL: 1
BH CV LOWER VASCULAR LEFT PERONEAL COMPRESS: NORMAL
BH CV LOWER VASCULAR LEFT PERONEAL THROMBUS: NORMAL
BH CV LOWER VASCULAR LEFT POPLITEAL AUGMENT: NORMAL
BH CV LOWER VASCULAR LEFT POPLITEAL COMPETENT: NORMAL
BH CV LOWER VASCULAR LEFT POPLITEAL COMPRESS: NORMAL
BH CV LOWER VASCULAR LEFT POPLITEAL PHASIC: NORMAL
BH CV LOWER VASCULAR LEFT POPLITEAL SPONT: NORMAL
BH CV LOWER VASCULAR LEFT POSTERIOR TIBIAL COMPRESS: NORMAL
BH CV LOWER VASCULAR LEFT PROFUNDA FEMORAL COMPRESS: NORMAL
BH CV LOWER VASCULAR LEFT PROXIMAL FEMORAL COMPRESS: NORMAL
BH CV LOWER VASCULAR LEFT SAPHENOFEMORAL JUNCTION COMPRESS: NORMAL
BH CV LOWER VASCULAR LEFT SAPHENOFEMORAL JUNCTION THROMBUS: NORMAL
BH CV LOWER VASCULAR LEFT SOLEAL COMPRESS: NORMAL
BH CV LOWER VASCULAR LEFT SOLEAL THROMBUS: NORMAL
BH CV LOWER VASCULAR LEFT VARICOSITY AK COMPRESS: NORMAL
BH CV LOWER VASCULAR LEFT VARICOSITY AK THROMBUS: NORMAL
BH CV LOWER VASCULAR LEFT VARICOSITY BK COMPRESS: NORMAL
BH CV LOWER VASCULAR LEFT VARICOSITY BK THROMBUS: NORMAL
BH CV LOWER VASCULAR RIGHT COMMON FEMORAL AUGMENT: NORMAL
BH CV LOWER VASCULAR RIGHT COMMON FEMORAL COMPETENT: NORMAL
BH CV LOWER VASCULAR RIGHT COMMON FEMORAL COMPRESS: NORMAL
BH CV LOWER VASCULAR RIGHT COMMON FEMORAL PHASIC: NORMAL
BH CV LOWER VASCULAR RIGHT COMMON FEMORAL SPONT: NORMAL
BH CV XLRA - TDI S': 9.76 CM/SEC
BUN SERPL-MCNC: 11 MG/DL (ref 8–23)
BUN/CREAT SERPL: 13.4 (ref 7–25)
CALCIUM SPEC-SCNC: 8.3 MG/DL (ref 8.6–10.5)
CHLORIDE SERPL-SCNC: 103 MMOL/L (ref 98–107)
CO2 SERPL-SCNC: 22.4 MMOL/L (ref 22–29)
CREAT SERPL-MCNC: 0.82 MG/DL (ref 0.76–1.27)
DEPRECATED RDW RBC AUTO: 41.8 FL (ref 37–54)
EOSINOPHIL # BLD AUTO: 0.03 10*3/MM3 (ref 0–0.4)
EOSINOPHIL NFR BLD AUTO: 0.3 % (ref 0.3–6.2)
ERYTHROCYTE [DISTWIDTH] IN BLOOD BY AUTOMATED COUNT: 12.4 % (ref 12.3–15.4)
GFR SERPL CREATININE-BSD FRML MDRD: 94 ML/MIN/1.73
GLUCOSE SERPL-MCNC: 89 MG/DL (ref 65–99)
HCT VFR BLD AUTO: 40.1 % (ref 37.5–51)
HGB BLD-MCNC: 13.7 G/DL (ref 13–17.7)
IMM GRANULOCYTES # BLD AUTO: 0.05 10*3/MM3 (ref 0–0.05)
IMM GRANULOCYTES NFR BLD AUTO: 0.5 % (ref 0–0.5)
LEFT ATRIUM VOLUME INDEX: 21 ML/M2
LYMPHOCYTES # BLD AUTO: 0.64 10*3/MM3 (ref 0.7–3.1)
LYMPHOCYTES NFR BLD AUTO: 6.9 % (ref 19.6–45.3)
MAXIMAL PREDICTED HEART RATE: 155 BPM
MCH RBC QN AUTO: 31.5 PG (ref 26.6–33)
MCHC RBC AUTO-ENTMCNC: 34.2 G/DL (ref 31.5–35.7)
MCV RBC AUTO: 92.2 FL (ref 79–97)
MONOCYTES # BLD AUTO: 1.08 10*3/MM3 (ref 0.1–0.9)
MONOCYTES NFR BLD AUTO: 11.7 % (ref 5–12)
NEUTROPHILS NFR BLD AUTO: 7.39 10*3/MM3 (ref 1.7–7)
NEUTROPHILS NFR BLD AUTO: 80.4 % (ref 42.7–76)
NRBC BLD AUTO-RTO: 0 /100 WBC (ref 0–0.2)
NT-PROBNP SERPL-MCNC: 133.2 PG/ML (ref 0–900)
PLATELET # BLD AUTO: 295 10*3/MM3 (ref 140–450)
PMV BLD AUTO: 10.5 FL (ref 6–12)
POTASSIUM SERPL-SCNC: 3.9 MMOL/L (ref 3.5–5.2)
RBC # BLD AUTO: 4.35 10*6/MM3 (ref 4.14–5.8)
SODIUM SERPL-SCNC: 136 MMOL/L (ref 136–145)
STRESS TARGET HR: 132 BPM
TROPONIN T SERPL-MCNC: <0.01 NG/ML (ref 0–0.03)
WBC # BLD AUTO: 9.21 10*3/MM3 (ref 3.4–10.8)

## 2020-09-16 PROCEDURE — G0378 HOSPITAL OBSERVATION PER HR: HCPCS

## 2020-09-16 PROCEDURE — 83880 ASSAY OF NATRIURETIC PEPTIDE: CPT | Performed by: NURSE PRACTITIONER

## 2020-09-16 PROCEDURE — 93971 EXTREMITY STUDY: CPT

## 2020-09-16 PROCEDURE — 25010000002 HYDROMORPHONE 1 MG/ML SOLUTION: Performed by: INTERNAL MEDICINE

## 2020-09-16 PROCEDURE — 25010000002 MORPHINE PER 10 MG: Performed by: INTERNAL MEDICINE

## 2020-09-16 PROCEDURE — 96376 TX/PRO/DX INJ SAME DRUG ADON: CPT

## 2020-09-16 PROCEDURE — 80048 BASIC METABOLIC PNL TOTAL CA: CPT | Performed by: NURSE PRACTITIONER

## 2020-09-16 PROCEDURE — 93306 TTE W/DOPPLER COMPLETE: CPT

## 2020-09-16 PROCEDURE — 96366 THER/PROPH/DIAG IV INF ADDON: CPT

## 2020-09-16 PROCEDURE — 84484 ASSAY OF TROPONIN QUANT: CPT | Performed by: NURSE PRACTITIONER

## 2020-09-16 PROCEDURE — 93306 TTE W/DOPPLER COMPLETE: CPT | Performed by: INTERNAL MEDICINE

## 2020-09-16 PROCEDURE — 36415 COLL VENOUS BLD VENIPUNCTURE: CPT | Performed by: NURSE PRACTITIONER

## 2020-09-16 PROCEDURE — 85730 THROMBOPLASTIN TIME PARTIAL: CPT | Performed by: HOSPITALIST

## 2020-09-16 PROCEDURE — 85025 COMPLETE CBC W/AUTO DIFF WBC: CPT | Performed by: NURSE PRACTITIONER

## 2020-09-16 PROCEDURE — 25010000002 HEPARIN (PORCINE) PER 1000 UNITS: Performed by: NURSE PRACTITIONER

## 2020-09-16 PROCEDURE — 96375 TX/PRO/DX INJ NEW DRUG ADDON: CPT

## 2020-09-16 RX ORDER — OXYCODONE HYDROCHLORIDE AND ACETAMINOPHEN 5; 325 MG/1; MG/1
1 TABLET ORAL EVERY 4 HOURS PRN
Status: DISCONTINUED | OUTPATIENT
Start: 2020-09-16 | End: 2020-09-17 | Stop reason: HOSPADM

## 2020-09-16 RX ADMIN — SODIUM CHLORIDE 50 ML/HR: 9 INJECTION, SOLUTION INTRAVENOUS at 19:15

## 2020-09-16 RX ADMIN — HYDROMORPHONE HYDROCHLORIDE 1 MG: 1 INJECTION, SOLUTION INTRAMUSCULAR; INTRAVENOUS; SUBCUTANEOUS at 14:30

## 2020-09-16 RX ADMIN — OXYCODONE HYDROCHLORIDE AND ACETAMINOPHEN 1 TABLET: 5; 325 TABLET ORAL at 18:35

## 2020-09-16 RX ADMIN — OXYCODONE HYDROCHLORIDE AND ACETAMINOPHEN 1 TABLET: 5; 325 TABLET ORAL at 23:41

## 2020-09-16 RX ADMIN — HEPARIN SODIUM 13.7 UNITS/KG/HR: 10000 INJECTION, SOLUTION INTRAVENOUS at 13:35

## 2020-09-16 RX ADMIN — HYDROMORPHONE HYDROCHLORIDE 1 MG: 1 INJECTION, SOLUTION INTRAMUSCULAR; INTRAVENOUS; SUBCUTANEOUS at 04:10

## 2020-09-16 RX ADMIN — HYDROMORPHONE HYDROCHLORIDE 1 MG: 1 INJECTION, SOLUTION INTRAMUSCULAR; INTRAVENOUS; SUBCUTANEOUS at 10:47

## 2020-09-16 RX ADMIN — HYDROMORPHONE HYDROCHLORIDE 1 MG: 1 INJECTION, SOLUTION INTRAMUSCULAR; INTRAVENOUS; SUBCUTANEOUS at 17:37

## 2020-09-16 RX ADMIN — HYDROMORPHONE HYDROCHLORIDE 1 MG: 1 INJECTION, SOLUTION INTRAMUSCULAR; INTRAVENOUS; SUBCUTANEOUS at 00:34

## 2020-09-16 RX ADMIN — MORPHINE SULFATE 4 MG: 2 INJECTION, SOLUTION INTRAMUSCULAR; INTRAVENOUS at 13:06

## 2020-09-16 RX ADMIN — HYDROMORPHONE HYDROCHLORIDE 1 MG: 1 INJECTION, SOLUTION INTRAMUSCULAR; INTRAVENOUS; SUBCUTANEOUS at 07:30

## 2020-09-16 RX ADMIN — APIXABAN 10 MG: 5 TABLET, FILM COATED ORAL at 20:38

## 2020-09-16 NOTE — PROGRESS NOTES
Clinical Pharmacy Services: Medication History    Hector Kam is a 65 y.o. male presenting to [facility] for   Chief Complaint   Patient presents with   • Chest Pain   • Shortness of Breath   • Leg Pain       He  has a past medical history of Cervical disc disease.    Allergies as of 09/15/2020   • (No Known Allergies)       Medication information was obtained from: patient    Prior to Admission Medications     None        Medication notes: Patient confirmed he does not take any prescription or over-the-counter medications.     This medication list is complete to the best of my knowledge as of 9/16/2020    Please call if questions.    Rhina GarvinD Candidate  9/16/2020 14:22 EDT

## 2020-09-16 NOTE — H&P
"    Patient Name:  Hector Kam  YOB: 1955  MRN:  [id number]  Admit Date:  9/15/2020  Patient Care Team:  Nelson Mccoy MD as PCP - General (Internal Medicine)      Subjective   History Present Illness     Chief Complaint   Patient presents with   • Chest Pain   • Shortness of Breath   • Leg Pain     History of Present Illness   Mr. Kam is a 65-year-old male with no significant history who presents to the emergency room with acute onset of shortness of breath today.  Patient states he did have some left leg pain for the past 10 days, with some mild swelling just above the knee, with some associated redness that started a few days ago.  He describes the pain is some \"muscle type pain\" and he thought it was just some type of pulled muscle and generally pain attention to it.  He denies any fever, cough, dizziness, chest pain.  He did have some sharp right shoulder pain last night prior to his shortness of breath this a.m.  Patient has no history of DVT or PE, he takes no medications at home, he has no significant heart or lung issues.  In the emergency room patient COVID-19 testing was negative, troponin 0.01, electrolytes essentially unremarkable, INR 1.16, white blood cell count 10.02, hemoglobin 14.5.  CTA of his chest shows an acute pulmonary thromboembolism in the right lung with a large burden of thrombus in the right lower lobe.  There are parenchymal changes in the periphery of the right lower lobe which may represent infarction.  There is no dilatation of the right ventricle.  Patient was started on heparin drip in the emergency room.  EKG shows sinus rhythm with a right bundle branch block.  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 Negative for appetite change, fatigue and fever.   HENT: Negative for nosebleeds and trouble swallowing.    Eyes: Negative for photophobia, " "redness and visual disturbance.   Respiratory: Positive for shortness of breath (sudden onset this am). Negative for cough, chest tightness and wheezing.    Cardiovascular: Negative for chest pain, palpitations and leg swelling.   Gastrointestinal: Negative for abdominal distention, abdominal pain, nausea and vomiting.   Endocrine: Negative.    Genitourinary: Negative.    Musculoskeletal: Positive for myalgias (left leg \"muscle type pain\" for about 10 days with some mild swelling and redness that started a few days ago). Negative for gait problem and joint swelling.   Skin: Negative.    Neurological: Negative for dizziness, seizures, speech difficulty, light-headedness and headaches.   Hematological: Negative.    Psychiatric/Behavioral: Negative for behavioral problems and confusion.        Personal History     Past Medical History:   Diagnosis Date   • Cervical disc disease      Past Surgical History:   Procedure Laterality Date   • COLONOSCOPY      2018     Family History   Problem Relation Age of Onset   • Alcohol abuse Father    • Cancer Father         Bladder   • Thyroid disease Sister    • Prostate cancer Paternal Grandfather      Social History     Tobacco Use   • Smoking status: Former Smoker   • Smokeless tobacco: Never Used   Substance Use Topics   • Alcohol use: Yes     Comment: 3-4 a week.   • Drug use: No     No current facility-administered medications on file prior to encounter.      No current outpatient medications on file prior to encounter.     No Known Allergies    Objective    Objective     Vital Signs  Temp:  [98.8 °F (37.1 °C)] 98.8 °F (37.1 °C)  Heart Rate:  [101-112] 109  Resp:  [18] 18  BP: (117-171)/() 117/80  SpO2:  [94 %-97 %] 96 %  on   ;   Device (Oxygen Therapy): room air  Body mass index is 27.71 kg/m².    Physical Exam  Vitals signs and nursing note reviewed.   Constitutional:       General: He is not in acute distress.     Appearance: He is well-developed.   HENT:      Head: " "Normocephalic.   Neck:      Musculoskeletal: Normal range of motion.      Vascular: No JVD.   Cardiovascular:      Rate and Rhythm: Normal rate and regular rhythm.      Comments: Heart rate 77, normal sinus rhythm on the monitor, no chest pain  Pulmonary:      Effort: Pulmonary effort is normal. Tachypnea present.      Breath sounds: Normal breath sounds.      Comments: Lung sounds clear, patient is mildly tachypneic, states he is \"nervous\" about being in the hospital, he denies any significant shortness of breath at this time, sats are 98% on room air during my exam.  Abdominal:      General: There is no distension.      Palpations: Abdomen is soft.      Tenderness: There is no abdominal tenderness.   Musculoskeletal: Normal range of motion.   Skin:     General: Skin is warm and dry.      Capillary Refill: Capillary refill takes less than 2 seconds.          Neurological:      Mental Status: He is alert and oriented to person, place, and time.   Psychiatric:         Mood and Affect: Mood is anxious.         Behavior: Behavior normal.      Comments: Patient appears a little anxious, he states he is just \"nervous about being in the hospital\" he does not appear to be in any acute distress.         Results Review:  I reviewed the patient's new clinical results.  I reviewed the patient's new imaging results and agree with the interpretation.  I reviewed the patient's other test results and agree with the interpretation  I personally viewed and interpreted the patient's EKG/Telemetry data  Discussed with ED provider.    Lab Results (last 24 hours)     Procedure Component Value Units Date/Time    CBC & Differential [[id number]]  (Abnormal) Collected: 09/15/20 1855    Specimen: Blood Updated: 09/15/20 1907    Narrative:      The following orders were created for panel order CBC & Differential.  Procedure                               Abnormality         Status                     ---------                               " -----------         ------                     CBC Auto Differential[[id number]]        Abnormal            Final result                 Please view results for these tests on the individual orders.    Comprehensive Metabolic Panel [[id number]]  (Abnormal) Collected: 09/15/20 1855    Specimen: Blood Updated: 09/15/20 1942     Glucose 98 mg/dL      BUN 10 mg/dL      Creatinine 0.88 mg/dL      Sodium 138 mmol/L      Potassium 3.9 mmol/L      Chloride 102 mmol/L      CO2 25.7 mmol/L      Calcium 8.9 mg/dL      Total Protein 7.4 g/dL      Albumin 4.00 g/dL      ALT (SGPT) 22 U/L      AST (SGOT) 17 U/L      Alkaline Phosphatase 120 U/L      Total Bilirubin 0.6 mg/dL      eGFR Non African Amer 87 mL/min/1.73      Globulin 3.4 gm/dL      A/G Ratio 1.2 g/dL      BUN/Creatinine Ratio 11.4     Anion Gap 10.3 mmol/L     Narrative:      GFR Normal >60  Chronic Kidney Disease <60  Kidney Failure <15      Protime-INR [[id number]]  (Abnormal) Collected: 09/15/20 1855    Specimen: Blood Updated: 09/15/20 1925     Protime 14.7 Seconds      INR 1.16    Troponin [[id number]]  (Normal) Collected: 09/15/20 1855    Specimen: Blood Updated: 09/15/20 1945     Troponin T <0.010 ng/mL     Narrative:      Troponin T Reference Range:  <= 0.03 ng/mL-   Negative for AMI  >0.03 ng/mL-     Abnormal for myocardial necrosis.  Clinicians would have to utilize clinical acumen, EKG, Troponin and serial changes to determine if it is an Acute Myocardial Infarction or myocardial injury due to an underlying chronic condition.       Results may be falsely decreased if patient taking Biotin.      BNP [[id number]]  (Normal) Collected: 09/15/20 1855    Specimen: Blood Updated: 09/15/20 1945     proBNP 135.6 pg/mL     Narrative:      Among patients with dyspnea, NT-proBNP is highly sensitive for the detection of acute congestive heart failure. In addition NT-proBNP of <300 pg/ml effectively rules out acute congestive heart failure with 99% negative predictive  value.    Results may be falsely decreased if patient taking Biotin.      CBC Auto Differential [[id number]]  (Abnormal) Collected: 09/15/20 1855    Specimen: Blood Updated: 09/15/20 1907     WBC 10.02 10*3/mm3      RBC 4.57 10*6/mm3      Hemoglobin 14.5 g/dL      Hematocrit 41.5 %      MCV 90.8 fL      MCH 31.7 pg      MCHC 34.9 g/dL      RDW 12.7 %      RDW-SD 41.3 fl      MPV 10.1 fL      Platelets 291 10*3/mm3      Neutrophil % 76.1 %      Lymphocyte % 9.5 %      Monocyte % 12.8 %      Eosinophil % 0.9 %      Basophil % 0.3 %      Immature Grans % 0.4 %      Neutrophils, Absolute 7.63 10*3/mm3      Lymphocytes, Absolute 0.95 10*3/mm3      Monocytes, Absolute 1.28 10*3/mm3      Eosinophils, Absolute 0.09 10*3/mm3      Basophils, Absolute 0.03 10*3/mm3      Immature Grans, Absolute 0.04 10*3/mm3      nRBC 0.0 /100 WBC     Respiratory Panel PCR w/COVID-19(SARS-CoV-2) THIAGO/MAR/FRANCIA/PAD/[facility] In-House, NP Swab in UTM/VTM, 3-4 HR TAT - Swab, Nasopharynx [[id number]]  (Normal) Collected: 09/15/20 1857    Specimen: Swab from Nasopharynx Updated: 09/15/20 2009     ADENOVIRUS, PCR Not Detected     Coronavirus 229E Not Detected     Coronavirus HKU1 Not Detected     Coronavirus NL63 Not Detected     Coronavirus OC43 Not Detected     COVID19 Not Detected     Human Metapneumovirus Not Detected     Human Rhinovirus/Enterovirus Not Detected     Influenza A PCR Not Detected     Influenza A H1 Not Detected     Influenza A H1 2009 PCR Not Detected     Influenza A H3 Not Detected     Influenza B PCR Not Detected     Parainfluenza Virus 1 Not Detected     Parainfluenza Virus 2 Not Detected     Parainfluenza Virus 3 Not Detected     Parainfluenza Virus 4 Not Detected     RSV, PCR Not Detected     Bordetella pertussis pcr Not Detected     Bordetella parapertussis PCR Not Detected     Chlamydophila pneumoniae PCR Not Detected     Mycoplasma pneumo by PCR Not Detected    Narrative:      Fact sheet for providers:  https://docs.[employer]/wp-content/uploads/FAS5965-2288-MU2.1-EUA-Provider-Fact-Sheet-3.pdf    Fact sheet for patients: https://docs.[employer]/wp-content/uploads/VVS5320-3338-LO2.1-EUA-Patient-Fact-Sheet-1.pdf          Imaging Results (Last 24 Hours)     Procedure Component Value Units Date/Time    CT Angiogram Chest [[id number]] Collected: 09/15/20 2024     Updated: 09/15/20 2037    Narrative:      CT ANGIOGRAM CHEST     HISTORY: Shortness of breath and chest pain. Evaluate for pulmonary  embolism.     TECHNIQUE: Spiral CT angiogram of the chest with multiplanar and  three-dimensional reformatted images is provided. 95 mL Isovue-370  contrast was used. This is correlated with a chest x-ray performed  earlier today and reported separately.  Radiation dose reduction techniques were utilized, including automated  exposure control and exposure modulation based on body size.     FINDINGS: There is a very small right pleural effusion layering  posteriorly and measuring about 10 mm thick. There is a large, acute  pulmonary thromboembolism in the right lower lobe pulmonary arterial  tree, partially occlusive along the anterolateral branches. There is  nonocclusive, small thrombus and an anteroinferior right upper lobe  pulmonary arterial branch. No embolism is identified on the left. RV to  LV ratio is 0.8. The thoracic aorta appears normal. Visualized upper  abdominal solid organs appear normal.     There is patchy opacity in the periphery of the right lower lobe  inferiorly which may represent pneumonitis or pulmonary infarction.  There is some minimal atelectasis at the left lung base in the lower  lobe and the lingula. The lungs are otherwise clear.     The bones and body wall appear normal.       Impression:      Acute pulmonary thromboembolism in the right lung with the  largest burden of thrombus in the right lower lobe. There are  parenchymal changes in the periphery of the right lower lobe which  may  represent infarction. There is no dilatation of the right ventricle.     I called the findings to Supriya Inman in the emergency department at  8:10 PM.     This report was finalized on 9/15/2020 8:34 PM by Dr. Sharath Wong M.D.       XR Chest 1 View [[id number]] Collected: 09/15/20 1951     Updated: 09/15/20 2007    Narrative:      ONE VIEW PORTABLE CHEST     HISTORY: Shortness of breath.     FINDINGS: The lungs are well-expanded with patchy density at the right  base that is new since the study of 10/08/2018 and suspicious for an  area of developing pneumonia. The heart is top normal in size.     This report was finalized on 9/15/2020 8:04 PM by Dr. Jake Petersen M.D.                  ECG 12 Lead   Final Result   HEART RATE= 91  bpm   RR Interval= 660  ms   NH Interval= 168  ms   P Horizontal Axis= -5  deg   P Front Axis= 70  deg   QRSD Interval= 150  ms   QT Interval= 382  ms   QRS Axis= 26  deg   T Wave Axis= 46  deg   - ABNORMAL ECG -   Sinus rhythm   Right bundle branch block   NO PRIOR TRACING AVAILABLE FOR COMPARISON   Electronically Signed By: Julio César Pavon ([facility]) 15-Sep-2020 21:15:49   Date and Time of Study: 2020-09-15 18:04:16           Assessment/Plan     Active Hospital Problems    Diagnosis POA   • Acute pulmonary embolism (CMS/HCC) [I26.99] Yes   • Acute deep vein thrombosis (DVT) of lower extremity (CMS/HCC) [I82.409] Unknown     Mr. Kam is a 65-year-old male with no significant history who presents to the emergency room with acute onset of shortness of breath today.     Acute pulmonary embolism/DVT left lower extremity  -On heparin drip in the emergency room, will continue per protocol  -Telemetry unit, continuous pulse ox, O2 to keep sats above 92  -Doppler of left lower extremity  -We will wait on 2D echo, CT shows no dilatation of the right ventricle  -Patient has been less mobile recently just due to working from home on the computer, has no history, may need  hypercoagulability work-up.    · I discussed the patient's findings and my recommendations with patient and ED provider.    VTE Prophylaxis - SCDs.  Code Status - Full code.       KATERINE Bartholomew  [facility]  09/15/20  22:38 EDT

## 2020-09-16 NOTE — ED PROVIDER NOTES
Pt presents to the ED c/o  shortness of breath which began this morning.  It has been constant and progressively worse.  He has had left lower extremity pain and swelling for the past 10 days or so as well.  He has no prior history of DVT/PE.  CTA of the chest today does show right upper and lower lobe pulmonary embolism with areas of pulmonary infarction.     On exam,   Awake, alert, no acute distress.  He is slightly anxious.  CV-slightly tachycardic, but otherwise regular rhythm.  Lungs-clear to auscultation bilaterally without wheezes, rhonchi or rales  Extremities-there is a tender, palpable cord in the left lower extremity     EKG performed at 1804 and interpreted by me shows normal sinus rhythm with a heart rate of 91 bpm.  Parables are normal.  There is a right bundle branch block.  ST-T segments are unremarkable.  There are no prior EKGs available for comparison.    Plan: High-dose heparin bolus and drip have been ordered.  He will be admitted to the hospital for further treatment and evaluation.      Appropriate PPE was worn by myself and the patient throughout our entire interaction.       Attestation:  The BAKARI and I have discussed this patient's history, physical exam, and treatment plan.  I have reviewed the documentation and personally had a face to face interaction with the patient. I affirm the documentation and agree with the treatment and plan.  The attached note describes my personal findings.            Hoang Hernandez MD  09/15/20 2027

## 2020-09-16 NOTE — PROGRESS NOTES
Name: Hector Kam ADMIT: 9/15/2020   : 1955  PCP: Nelson Mccoy MD    MRN: [id number] LOS: 1 days   AGE/SEX: 65 y.o. male  ROOM: 49/49     Subjective   Subjective   Feels a little better today.  Breathing a little better but still having difficulty completing his sentences.  Still with pleuritic pain right lower chest.  Pain in leg unchanged but not severe.  Denies any palpitations, lightheadedness, presyncope or syncope    Review of Systems   Respiratory: Positive for shortness of breath.    Cardiovascular: Positive for chest pain.   Gastrointestinal: Negative for nausea.        Objective   Objective   Vital Signs  Temp:  [98.1 °F (36.7 °C)-98.8 °F (37.1 °C)] 98.1 °F (36.7 °C)  Heart Rate:  [] 92  Resp:  [18-31] 31  BP: (117-171)/() 144/96  SpO2:  [92 %-99 %] 99 %  on  Flow (L/min):  [4] 4;   Device (Oxygen Therapy): nasal cannula  Body mass index is 27.71 kg/m².  Physical Exam  Vitals signs and nursing note reviewed.   Constitutional:       General: He is not in acute distress.     Appearance: Normal appearance. He is well-developed.   Neck:      Vascular: No JVD.      Trachea: No tracheal deviation.   Cardiovascular:      Rate and Rhythm: Regular rhythm. Tachycardia present.      Heart sounds: No murmur.   Pulmonary:      Effort: Tachypnea and respiratory distress present.      Breath sounds: Normal breath sounds.   Abdominal:      General: Bowel sounds are normal. There is no distension.      Palpations: Abdomen is soft.      Tenderness: There is no abdominal tenderness.   Musculoskeletal:      Comments: LLE with edema and redness medial thigh   Skin:     General: Skin is warm and dry.   Neurological:      Mental Status: He is alert and oriented to person, place, and time.   Psychiatric:         Behavior: Behavior normal. Behavior is cooperative.         Results Review:       I reviewed the patient's new clinical results.  Results from last 7 days   Lab Units 09/15/20  4784      WBC 10*3/mm3 10.02   HEMOGLOBIN g/dL 14.5   PLATELETS 10*3/mm3 291     Results from last 7 days   Lab Units 09/15/20  1855   SODIUM mmol/L 138   POTASSIUM mmol/L 3.9   CHLORIDE mmol/L 102   CO2 mmol/L 25.7   BUN mg/dL 10   CREATININE mg/dL 0.88   GLUCOSE mg/dL 98   Estimated Creatinine Clearance: 112.8 mL/min (by C-G formula based on SCr of 0.88 mg/dL).  Results from last 7 days   Lab Units 09/15/20  1855   ALBUMIN g/dL 4.00   BILIRUBIN mg/dL 0.6   ALK PHOS U/L 120*   AST (SGOT) U/L 17   ALT (SGPT) U/L 22     Results from last 7 days   Lab Units 09/15/20  1855   CALCIUM mg/dL 8.9   ALBUMIN g/dL 4.00     .  Results from last 7 days   Lab Units 09/15/20  1855   TROPONIN T ng/mL <0.010   PROBNP pg/mL 135.6     COVID19   Date Value Ref Range Status   09/15/2020 Not Detected Not Detected - Ref. Range Final       CT Angiogram Chest  Impression:   Acute pulmonary thromboembolism in the right lung with the  largest burden of thrombus in the right lower lobe. There are  parenchymal changes in the periphery of the right lower lobe which may  represent infarction. There is no dilatation of the right ventricle.     Adult Transthoracic Echo Complete W/ Cont if Necessary Per Protocol  · Calculated EF = 58% Estimated EF was in agreement with the calculated   EF.  · Normal echo     Duplex Venous Lower Extremity LEFT  · Acute left lower extremity superficial thrombophlebitis noted in the   saphenofemoral junction, greater saphenous (above knee), greater saphenous   (below knee), , varicosity (above knee) and varicosity (below   knee).  · Acute left lower extremity deep vein thrombosis noted in the peroneal   and soleal.  · All other left sided veins appeared normal.       morphine, , ,   sodium chloride, 10 mL, Intravenous, Q12H      heparin (porcine), 15.7 Units/kg/hr, Last Rate: 15.7 Units/kg/hr (09/16/20 0031)  sodium chloride, 50 mL/hr, Last Rate: 50 mL/hr (09/16/20 0013)    Diet Regular       Assessment/Plan      Active Hospital Problems    Diagnosis  POA   • **Acute pulmonary embolism without acute cor pulmonale (CMS/HCC) [I26.99]  Yes   • Acute deep vein thrombosis (DVT) of lower extremity (CMS/HCC) [I82.409]  Yes      Resolved Hospital Problems   No resolved problems to display.       65 y.o. male admitted with Acute pulmonary embolism without acute cor pulmonale (CMS/HCC).    Venous Doppler study and echocardiogram reviewed above.  No evidence of cor pulmonale.  Troponin and BNP normal.  Will plan transitioning off heparin drip and onto Eliquis.  No clear reason for development of this VTE.  Will ask hematology to evaluate him.  Will add percocet for pain.      · Full code.  · Discussed with patient and nursing staff.  · Anticipate discharge home in 1-2 days.      Mike Estrada MD  [facility]  09/16/20  07:07 EDT

## 2020-09-16 NOTE — PLAN OF CARE
Goal Outcome Evaluation:  Plan of Care Reviewed With: patient  Progress: no change  Patient pain better controlled with PRN meds as ordered by MD.  Positive for superficial clots and DVT.  VSS .  Heparin drip continues

## 2020-09-16 NOTE — PLAN OF CARE
Goal Outcome Evaluation:  Plan of Care Reviewed With: patient  Progress: no change  Outcome Summary: Pt admitted for acute DVT with PE. C/o severe chest pain with SOA. 4L NC to maintain sats 95%. Heparin drip and IVF continued. Will continue to monitor progress and await further orders

## 2020-09-16 NOTE — PROGRESS NOTES
Left LE venous duplex compete.  Preliminary report: positive for acute DVT and acute SVT reported to Manjeet KIM.

## 2020-09-16 NOTE — ED NOTES
.Pt masked on arrival, RN wearing mask/goggles during encounter             Della Jeronimo, RN  09/15/20 2029

## 2020-09-17 ENCOUNTER — READMISSION MANAGEMENT (OUTPATIENT)
Dept: CALL CENTER | Facility: HOSPITAL | Age: 65
End: 2020-09-17

## 2020-09-17 ENCOUNTER — TELEPHONE (OUTPATIENT)
Dept: ONCOLOGY | Facility: CLINIC | Age: 65
End: 2020-09-17

## 2020-09-17 VITALS
TEMPERATURE: 98.2 F | OXYGEN SATURATION: 95 % | HEART RATE: 82 BPM | WEIGHT: 218.9 LBS | DIASTOLIC BLOOD PRESSURE: 93 MMHG | SYSTOLIC BLOOD PRESSURE: 149 MMHG | BODY MASS INDEX: 29.01 KG/M2 | RESPIRATION RATE: 18 BRPM | HEIGHT: 73 IN

## 2020-09-17 PROBLEM — I26.99 PULMONARY INFARCT (HCC): Status: ACTIVE | Noted: 2020-09-17

## 2020-09-17 PROCEDURE — G0378 HOSPITAL OBSERVATION PER HR: HCPCS

## 2020-09-17 RX ADMIN — APIXABAN 10 MG: 5 TABLET, FILM COATED ORAL at 08:37

## 2020-09-17 RX ADMIN — SODIUM CHLORIDE, PRESERVATIVE FREE 10 ML: 5 INJECTION INTRAVENOUS at 09:42

## 2020-09-17 NOTE — TELEPHONE ENCOUNTER
----- Message from Noah Harrison MD sent at 9/17/2020 12:07 PM EDT -----  Regarding: new patient appointment  Madeleine,    Please arrange 40-minute new patient appointment with me within the next few weeks.  Diagnosis is pulmonary embolism.  This was an inpatient consult but he is being discharged and I would prefer to see him back as a new patient in the office.      Thanks, SIDNEY

## 2020-09-17 NOTE — DISCHARGE SUMMARY
Patient Name: Hector Kam  : 1955  MRN: [id number]    Date of Admission: 9/15/2020  Date of Discharge:  2020  Primary Care Physician: Nelson Mccoy MD      Chief Complaint:   Chest Pain, Shortness of Breath, and Leg Pain      Discharge Diagnoses     Active Hospital Problems    Diagnosis  POA   • **Acute pulmonary embolism without acute cor pulmonale (CMS/HCC) [I26.99]  Yes   • Pulmonary infarct (CMS/HCC) [I26.99]  Yes   • Acute deep vein thrombosis (DVT) of lower extremity (CMS/HCC) [I82.409]  Yes      Resolved Hospital Problems   No resolved problems to display.        Hospital Course     Mr. Kam is a 65 y.o. male with no significant past medical history who presented to [facility] initially complaining of chest pain, SOA and leg pain.  Please see the admitting H&P for further details.  CT angiogram in the emergency department showed acute pulmonary thromboembolism in the right lung with largest burden of thrombus in the RLL.  There were findings likely suggestive of pulmonary infarction.  There was no dilation of the right ventricle.  Troponin and BNP were normal.  Echocardiogram was normal with no RV strain.  Venous Doppler demonstrated acute DVT left lower extremity.  He was initially managed with a heparin drip and transitioned to Eliquis.  He has tolerated anticoagulation well.  He had significant pleuritic pain due to the pulmonary infarct that is much better today.  He has not required any opiates for his pain.  He has no significant risk factors for VTE.  Consulted hematology yesterday afternoon and they have yet to see.  Discussed case with Dr. Noah Harrison who offered to see patient in consultation in the outpatient setting and likely pursue hypercoagulable work-up.  Does not change his treatment in the interim.  Patient agreeable.  Will plan discharge today with close follow-up with his primary care provider and with the [facility] group.      Day of Discharge          CP much better.  Breathing okay.     Physical Exam:  Temp:  [97.6 °F (36.4 °C)-100 °F (37.8 °C)] 98.2 °F (36.8 °C)  Heart Rate:  [82-99] 82  Resp:  [16-20] 18  BP: (112-149)/(80-96) 149/93  Body mass index is 28.88 kg/m².  Physical Exam  Constitutional:       General: He is not in acute distress.     Appearance: He is not diaphoretic.   Cardiovascular:      Rate and Rhythm: Normal rate and regular rhythm.      Heart sounds: No murmur.   Pulmonary:      Effort: Pulmonary effort is normal.      Breath sounds: Normal breath sounds.   Abdominal:      General: Bowel sounds are normal. There is no distension.      Palpations: Abdomen is soft.      Tenderness: There is no abdominal tenderness.   Musculoskeletal: Normal range of motion.   Skin:     General: Skin is warm and dry.   Neurological:      Mental Status: He is alert and oriented to person, place, and time.         Consultants     Consult Orders (all) (From admission, onward)     Start     Ordered    09/16/20 1727  Hematology & Oncology Inpatient Consult  Once     Specialty:  Hematology and Oncology  Provider:  Khoi Magana II, MD    09/16/20 1727              Procedures     Imaging Results (All)     Procedure Component Value Units Date/Time    CT Angiogram Chest [[id number]] Collected: 09/15/20 2024     Updated: 09/15/20 2037    Narrative:      CT ANGIOGRAM CHEST     HISTORY: Shortness of breath and chest pain. Evaluate for pulmonary  embolism.     TECHNIQUE: Spiral CT angiogram of the chest with multiplanar and  three-dimensional reformatted images is provided. 95 mL Isovue-370  contrast was used. This is correlated with a chest x-ray performed  earlier today and reported separately.  Radiation dose reduction techniques were utilized, including automated  exposure control and exposure modulation based on body size.     FINDINGS: There is a very small right pleural effusion layering  posteriorly and measuring about 10 mm thick. There is a large,  acute  pulmonary thromboembolism in the right lower lobe pulmonary arterial  tree, partially occlusive along the anterolateral branches. There is  nonocclusive, small thrombus and an anteroinferior right upper lobe  pulmonary arterial branch. No embolism is identified on the left. RV to  LV ratio is 0.8. The thoracic aorta appears normal. Visualized upper  abdominal solid organs appear normal.     There is patchy opacity in the periphery of the right lower lobe  inferiorly which may represent pneumonitis or pulmonary infarction.  There is some minimal atelectasis at the left lung base in the lower  lobe and the lingula. The lungs are otherwise clear.     The bones and body wall appear normal.       Impression:      Acute pulmonary thromboembolism in the right lung with the  largest burden of thrombus in the right lower lobe. There are  parenchymal changes in the periphery of the right lower lobe which may  represent infarction. There is no dilatation of the right ventricle.         Results for orders placed during the hospital encounter of 09/15/20   Duplex Venous Lower Extremity LEFT    Narrative · Acute left lower extremity superficial thrombophlebitis noted in the   saphenofemoral junction, greater saphenous (above knee), greater saphenous   (below knee), , varicosity (above knee) and varicosity (below   knee).  · Acute left lower extremity deep vein thrombosis noted in the peroneal   and soleal.  · All other left sided veins appeared normal.        Results for orders placed during the hospital encounter of 09/15/20   Adult Transthoracic Echo Complete W/ Cont if Necessary Per Protocol    Narrative · Calculated EF = 58% Estimated EF was in agreement with the calculated   EF.  · Normal echo        Pertinent Labs     Results from last 7 days   Lab Units 09/16/20  1147 09/15/20  1855   WBC 10*3/mm3 9.21 10.02   HEMOGLOBIN g/dL 13.7 14.5   PLATELETS 10*3/mm3 295 291     Results from last 7 days   Lab Units  09/16/20  1147 09/15/20  1855   SODIUM mmol/L 136 138   POTASSIUM mmol/L 3.9 3.9   CHLORIDE mmol/L 103 102   CO2 mmol/L 22.4 25.7   BUN mg/dL 11 10   CREATININE mg/dL 0.82 0.88   GLUCOSE mg/dL 89 98   Estimated Creatinine Clearance: 111.4 mL/min (by C-G formula based on SCr of 0.82 mg/dL).  Results from last 7 days   Lab Units 09/15/20  1855   ALBUMIN g/dL 4.00   BILIRUBIN mg/dL 0.6   ALK PHOS U/L 120*   AST (SGOT) U/L 17   ALT (SGPT) U/L 22     Results from last 7 days   Lab Units 09/16/20  1147 09/15/20  1855   CALCIUM mg/dL 8.3* 8.9   ALBUMIN g/dL  --  4.00       Results from last 7 days   Lab Units 09/16/20  1147 09/15/20  1855   TROPONIN T ng/mL <0.010 <0.010   PROBNP pg/mL 133.2 135.6           Invalid input(s): LDLCALC      Results from last 7 days   Lab Units 09/15/20  1857   COVID19  Not Detected       Test Results Pending at Discharge       Discharge Details        Discharge Medications      New Medications      Instructions Start Date   Eliquis DVT/PE Starter Pack tablet   5 mg, Oral, Take As Directed             No Known Allergies      Discharge Disposition:  Home or Self Care    Discharge Diet:  Diet Order   Procedures   • Diet Regular       Discharge Activity:   Activity Instructions     Activity as Tolerated            CODE STATUS:    Code Status and Medical Interventions:   Ordered at: 09/15/20 2106     Code Status:    CPR     Medical Interventions (Level of Support Prior to Arrest):    Full       No future appointments.  Additional Instructions for the Follow-ups that You Need to Schedule     Discharge Follow-up with PCP   As directed       Currently Documented PCP:    Nelson Mccoy MD    PCP Phone Number:    [phone]     Follow Up Details: 1 to 2 weeks (or sooner if problems)           Follow-up Information     Nelson Mccoy MD Follow up in 1 week(s).    Specialty: Internal Medicine  Contact information:  [address] [facility] [address] [phone]              Nelson Mccoy MD .    Specialty: Internal Medicine  Why: 1 to 2 weeks (or sooner if problems)  Contact information:  [address] [facility] [id number]  [phone]                   Additional Instructions for the Follow-ups that You Need to Schedule     Discharge Follow-up with PCP   As directed       Currently Documented PCP:    Nelson Mccoy MD    PCP Phone Number:    [phone]     Follow Up Details: 1 to 2 weeks (or sooner if problems)             Mike Estrada MD  [facility]  09/17/20  10:03 EDT

## 2020-09-17 NOTE — OUTREACH NOTE
Prep Survey      Responses   [facility] facility patient discharged from?  [facility]   Is LACE score < 7 ?  Yes   Eligibility  [facility]   Date of Admission  09/15/20   Date of Discharge  09/17/20   Discharge Disposition  Home or Self Care   Discharge diagnosis  Acute pulmonary embolism without acute cor pulmonale    COVID-19 Test Status  Negative   Does the patient have one of the following disease processes/diagnoses(primary or secondary)?  Other   Does the patient have Home health ordered?  No   Is there a DME ordered?  No   Prep survey completed?  Yes          Liane De La O RN

## 2020-09-17 NOTE — PLAN OF CARE
Goal Outcome Evaluation:  Plan of Care Reviewed With: patient  Progress: no change  Outcome Summary: pt new admission from ED here with acute PE. pt also has LLE  DVT. heparin was DC'd yesterday and pt started on eliquis. pt is A/O x 4, VSS. on 2LNC.  Medicated for pain per MAR. SR on monitor. Standby assist. CBC has been consulted.will CTM.

## 2020-09-17 NOTE — TELEPHONE ENCOUNTER
PT HAS BEEN SCHEDULED ON 9/28/20 - CALLED AND ADVISED OF APPT DATE AND TIME LVM OF THE APPT IF THIS IS NOT A GOOD DAY AND TIME TO CALL US AND WE WILL RESCHEDULED APPT

## 2020-09-17 NOTE — CONSULTS
Notified of patient's impending discharge.  He is much better on Eliquis.  We were consulted for a laboratory thrombophilia evaluation due to unprovoked VTE.  We can see him in the office as a new patient consult in the near future to discuss his situation and further evaluate as appropriate.  No need to delay the hospital discharge for me to see him as an inpatient.  I will have my office arrange a new patient appointment for him with me in the office in the next few weeks.

## 2020-09-18 ENCOUNTER — TRANSITIONAL CARE MANAGEMENT TELEPHONE ENCOUNTER (OUTPATIENT)
Dept: CALL CENTER | Facility: HOSPITAL | Age: 65
End: 2020-09-18

## 2020-09-18 NOTE — OUTREACH NOTE
Call Center TCM Note      Responses   [facility]   COVID-19 Test Status  Negative   Does the patient have one of the following disease processes/diagnoses(primary or secondary)?  Other   TCM attempt successful?  Yes   Call start time  1358   Call end time  1401   Discharge diagnosis  Acute pulmonary embolism without acute cor pulmonale    Is patient permission given to speak with other caregiver?  No   Meds reviewed with patient/caregiver?  Yes   Is the patient having any side effects they believe may be caused by any medication additions or changes?  No   Does the patient have all medications ordered at discharge?  Yes   Is the patient taking all medications as directed (includes completed medication regime)?  Yes   Does the patient have a primary care provider?   Yes   Does the patient have an appointment with their PCP within 7 days of discharge?  Greater than 7 days   Comments regarding PCP  PCP Nelson Mccoy MD, Hospital follow up scheduled for 9/30 3pm.    Nursing Interventions  -- [Scheduled needed PCP appt. ]   Has the patient kept scheduled appointments due by today?  N/A   Comments  Patient has appt scheduled with Dr Harrison, hematology, 9/28 4pm   Has home health visited the patient within 72 hours of discharge?  N/A   Pulse Ox monitoring  None   Psychosocial issues?  No   Did the patient receive a copy of their discharge instructions?  Yes   Nursing interventions  Reviewed instructions with patient   What is the patient's perception of their health status since discharge?  Same   Is the patient/caregiver able to teach back signs and symptoms related to disease process for when to call PCP?  Yes   Is the patient/caregiver able to teach back signs and symptoms related to disease process for when to call 911?  Yes   Is the patient/caregiver able to teach back the hierarchy of who to call/visit for symptoms/problems? PCP, Specialist, Home health nurse,  Urgent Care, ED, 911  Yes   TCM call completed?  Yes          Madeleine Luo, RN    9/18/2020, 14:02 EDT

## 2020-09-28 ENCOUNTER — CONSULT (OUTPATIENT)
Dept: ONCOLOGY | Facility: CLINIC | Age: 65
End: 2020-09-28

## 2020-09-28 ENCOUNTER — LAB (OUTPATIENT)
Dept: LAB | Facility: HOSPITAL | Age: 65
End: 2020-09-28

## 2020-09-28 VITALS
HEIGHT: 73 IN | OXYGEN SATURATION: 96 % | TEMPERATURE: 97.3 F | RESPIRATION RATE: 16 BRPM | SYSTOLIC BLOOD PRESSURE: 134 MMHG | WEIGHT: 214.6 LBS | DIASTOLIC BLOOD PRESSURE: 90 MMHG | HEART RATE: 97 BPM | BODY MASS INDEX: 28.44 KG/M2

## 2020-09-28 DIAGNOSIS — I26.99 PULMONARY EMBOLISM, UNSPECIFIED CHRONICITY, UNSPECIFIED PULMONARY EMBOLISM TYPE, UNSPECIFIED WHETHER ACUTE COR PULMONALE PRESENT (HCC): Primary | ICD-10-CM

## 2020-09-28 DIAGNOSIS — I26.99 ACUTE PULMONARY EMBOLISM WITHOUT ACUTE COR PULMONALE, UNSPECIFIED PULMONARY EMBOLISM TYPE (HCC): ICD-10-CM

## 2020-09-28 DIAGNOSIS — I82.452 ACUTE DEEP VEIN THROMBOSIS (DVT) OF LEFT PERONEAL VEIN (HCC): Primary | ICD-10-CM

## 2020-09-28 LAB
BASOPHILS # BLD AUTO: 0.04 10*3/MM3 (ref 0–0.2)
BASOPHILS NFR BLD AUTO: 0.6 % (ref 0–1.5)
DEPRECATED RDW RBC AUTO: 42 FL (ref 37–54)
EOSINOPHIL # BLD AUTO: 0.15 10*3/MM3 (ref 0–0.4)
EOSINOPHIL NFR BLD AUTO: 2.1 % (ref 0.3–6.2)
ERYTHROCYTE [DISTWIDTH] IN BLOOD BY AUTOMATED COUNT: 12.9 % (ref 12.3–15.4)
HCT VFR BLD AUTO: 43.7 % (ref 37.5–51)
HGB BLD-MCNC: 14.9 G/DL (ref 13–17.7)
IMM GRANULOCYTES # BLD AUTO: 0.04 10*3/MM3 (ref 0–0.05)
IMM GRANULOCYTES NFR BLD AUTO: 0.6 % (ref 0–0.5)
LYMPHOCYTES # BLD AUTO: 1.37 10*3/MM3 (ref 0.7–3.1)
LYMPHOCYTES NFR BLD AUTO: 19 % (ref 19.6–45.3)
MCH RBC QN AUTO: 30.4 PG (ref 26.6–33)
MCHC RBC AUTO-ENTMCNC: 34.1 G/DL (ref 31.5–35.7)
MCV RBC AUTO: 89.2 FL (ref 79–97)
MONOCYTES # BLD AUTO: 0.66 10*3/MM3 (ref 0.1–0.9)
MONOCYTES NFR BLD AUTO: 9.1 % (ref 5–12)
NEUTROPHILS NFR BLD AUTO: 4.96 10*3/MM3 (ref 1.7–7)
NEUTROPHILS NFR BLD AUTO: 68.6 % (ref 42.7–76)
NRBC BLD AUTO-RTO: 0 /100 WBC (ref 0–0.2)
PLATELET # BLD AUTO: 347 10*3/MM3 (ref 140–450)
PMV BLD AUTO: 10.1 FL (ref 6–12)
RBC # BLD AUTO: 4.9 10*6/MM3 (ref 4.14–5.8)
WBC # BLD AUTO: 7.22 10*3/MM3 (ref 3.4–10.8)

## 2020-09-28 PROCEDURE — 36415 COLL VENOUS BLD VENIPUNCTURE: CPT

## 2020-09-28 PROCEDURE — 85025 COMPLETE CBC W/AUTO DIFF WBC: CPT

## 2020-09-28 PROCEDURE — 99245 OFF/OP CONSLTJ NEW/EST HI 55: CPT | Performed by: INTERNAL MEDICINE

## 2020-09-28 NOTE — PROGRESS NOTES
REFERRING PROVIDER:    Nelson Mccoy MD  [address] [place] [address]    REASON FOR CONSULTATION:    Acute right lung pulmonary emboli diagnosed by CTA 9/15/2020  Acute left lower extremity DVT in the peroneal and soleal veins 9/15/2020    HISTORY OF PRESENT ILLNESS:  Hector Kam is a 65 y.o. male who is referred today for further evaluation of DVT and PE.    He presented to [facility] on 9/15/2020 with a 10-day history of left lower extremity discomfort followed by a 1 to 2-day history of right-sided chest pain shortness of breath and dyspnea on exertion.       CT angiogram of his chest in the emergency department on 9/15/2020 showed right-sided pulmonary emboli.  The largest burden was in the right lower lobe.  There were changes also suspected to be related to pulmonary infarction.  No dilation of the right ventricle noted.    A subsequent left lower extremity venous duplex on 9/16/2020 showed superficial thrombophlebitis as well as acute left lower extremity DVT in the peroneal and soleal veins.    An echocardiogram on 9/16/2020 showed a left ventricular ejection fraction of 58% and was otherwise normal.    He was discharged with Eliquis.  He took 10 mg twice daily for 7 days and now is on 5 mg twice daily.  He notes some cold sensitivity and night sweats on this.  He has had no bleeding.  He otherwise tolerates it well.    There was no preceding immobilization prior to his venous thromboembolism.  He typically does exercise quite a bit.  He was going to the gym before the COVID-19 pandemic.  He was walking a lot on his lunch breaks.  However, he has now been working at home and he has not been exercising and he has been much more sedentary over the past few months.    No personal history of malignancy other than nonmelanoma skin cancer.  No personal history of venous thromboembolism.  No family history of venous thromboembolism.  He is up-to-date on his cancer  "screening.    His right-sided chest pain has improved significantly.  His left leg discomfort is improving.    Past Medical History:   Diagnosis Date   • Cervical disc disease    • DVT, lower extremity (CMS/HCC)    • Pulmonary embolism (CMS/HCC) 2020       Past Surgical History:   Procedure Laterality Date   • COLONOSCOPY      2018       SOCIAL HISTORY:   reports that he has quit smoking. He has never used smokeless tobacco. He reports current alcohol use. He reports that he does not use drugs.    FAMILY HISTORY:  family history includes Alcohol abuse in his father; Cancer in his father; Prostate cancer in his paternal grandfather; Thyroid disease in his sister.    ALLERGIES:  No Known Allergies    MEDICATIONS:  The medication list has been reviewed with the patient by the medical assistant, and the list has been updated in the electronic medical record, which I reviewed.  Medication dosages and frequencies were confirmed to be accurate.    Review of Systems   Respiratory: Positive for shortness of breath.    Cardiovascular: Positive for chest pain (Improving).   Musculoskeletal:        Left leg discomfort is improving   All other systems reviewed and are negative.      Vitals:    09/28/20 1529   BP: 134/90   Pulse: 97   Resp: 16   Temp: 97.3 °F (36.3 °C)   TempSrc: Temporal   SpO2: 96%   Weight: 97.3 kg (214 lb 9.6 oz)   Height: 185 cm (72.84\")   PainSc: 0-No pain  Comment: PE       Physical Exam  Vitals signs and nursing note reviewed.   Constitutional:       General: He is not in acute distress.     Appearance: He is well-developed.      Comments: Wearing a mask   HENT:      Head: Normocephalic and atraumatic. Hair is normal.   Eyes:      General: Lids are normal.      Conjunctiva/sclera: Conjunctivae normal.      Pupils: Pupils are equal.   Neck:      Musculoskeletal: Neck supple.      Thyroid: No thyroid mass or thyromegaly.      Vascular: No JVD.   Cardiovascular:      Rate and Rhythm: Normal rate and regular " rhythm.      Heart sounds: No murmur. No friction rub. No gallop.    Pulmonary:      Effort: Pulmonary effort is normal. No respiratory distress.      Breath sounds: Normal breath sounds. No wheezing or rales.   Abdominal:      General: There is no distension.      Palpations: Abdomen is soft. There is no hepatomegaly, splenomegaly or mass.      Tenderness: There is no abdominal tenderness. There is no guarding.   Musculoskeletal: Normal range of motion.         General: No tenderness or deformity.      Left lower leg: Edema (Trace) present.   Lymphadenopathy:      Cervical: No cervical adenopathy.      Upper Body:      Right upper body: No supraclavicular adenopathy.      Left upper body: No supraclavicular adenopathy.   Skin:     General: Skin is warm and dry.      Findings: No rash.   Neurological:      Mental Status: He is alert and oriented to person, place, and time.   Psychiatric:         Behavior: Behavior normal.         Thought Content: Thought content normal.         Judgment: Judgment normal.         DIAGNOSTIC DATA:  Results for orders placed or performed in visit on 09/28/20   CBC Auto Differential    Specimen: Blood   Result Value Ref Range    WBC 7.22 3.40 - 10.80 10*3/mm3    RBC 4.90 4.14 - 5.80 10*6/mm3    Hemoglobin 14.9 13.0 - 17.7 g/dL    Hematocrit 43.7 37.5 - 51.0 %    MCV 89.2 79.0 - 97.0 fL    MCH 30.4 26.6 - 33.0 pg    MCHC 34.1 31.5 - 35.7 g/dL    RDW 12.9 12.3 - 15.4 %    RDW-SD 42.0 37.0 - 54.0 fl    MPV 10.1 6.0 - 12.0 fL    Platelets 347 140 - 450 10*3/mm3    Neutrophil % 68.6 42.7 - 76.0 %    Lymphocyte % 19.0 (L) 19.6 - 45.3 %    Monocyte % 9.1 5.0 - 12.0 %    Eosinophil % 2.1 0.3 - 6.2 %    Basophil % 0.6 0.0 - 1.5 %    Immature Grans % 0.6 (H) 0.0 - 0.5 %    Neutrophils, Absolute 4.96 1.70 - 7.00 10*3/mm3    Lymphocytes, Absolute 1.37 0.70 - 3.10 10*3/mm3    Monocytes, Absolute 0.66 0.10 - 0.90 10*3/mm3    Eosinophils, Absolute 0.15 0.00 - 0.40 10*3/mm3    Basophils, Absolute 0.04  0.00 - 0.20 10*3/mm3    Immature Grans, Absolute 0.04 0.00 - 0.05 10*3/mm3    nRBC 0.0 0.0 - 0.2 /100 WBC       IMAGING:    I personally reviewed CT angiogram chest images from 9/15/2020.  Right-sided pulmonary emboli with evidence for possible infarction.    ASSESSMENT:  This is a 65 y.o. male with:    *Acute right-sided pulmonary emboli and left lower extremity deep vein thrombosis diagnosed on 9/15/2020  • He has been significantly less active than usual over the past 6 months secondary to the COVID-19 pandemic  • Prior to this, he was exercising and walking every day  • He is now working from home and has been quite sedentary  • Otherwise, no preceding immobilization and no directly provoking events  • He is currently on Eliquis 5 mg twice daily having completed 7 days of 10 mg twice daily  • We discussed at length today whether or not to pursue a laboratory thrombophilia evaluation to evaluate for inherited or acquired reasons for venous thromboembolism.  He is not necessarily interested in pursuing this laboratory evaluation at this time.  • I have recommended a repeat left lower extremity venous duplex in 3 months and I will see him back after that for review  • We anticipate at least 6 months of anticoagulation given the presence of pulmonary emboli at diagnosis and assuming that his inactivity provoked this episode  • If at any point he wants to pursue a laboratory thrombophilia evaluation we can certainly send these labs    *Inactivity  • He will try to increase low impact activity to try to become more active    *Hot flashes  • New since starting Eliquis  • Continue to monitor and notify us of these worsen    *Chest pain related to pulmonary emboli and possible pulmonary infarction  · Improving on anticoagulation    PLAN:   1. Repeat left lower extremity venous duplex in 3 months and I will see him back after that for review.  2. Continue Eliquis 5 mg twice daily for now.  A prescription for this was  electronically sent to his pharmacy today.

## 2020-09-30 ENCOUNTER — OFFICE VISIT (OUTPATIENT)
Dept: FAMILY MEDICINE CLINIC | Facility: CLINIC | Age: 65
End: 2020-09-30

## 2020-09-30 VITALS
BODY MASS INDEX: 28.36 KG/M2 | SYSTOLIC BLOOD PRESSURE: 122 MMHG | WEIGHT: 214 LBS | TEMPERATURE: 98 F | HEIGHT: 73 IN | OXYGEN SATURATION: 96 % | DIASTOLIC BLOOD PRESSURE: 74 MMHG | RESPIRATION RATE: 16 BRPM | HEART RATE: 96 BPM

## 2020-09-30 DIAGNOSIS — I26.99 ACUTE PULMONARY EMBOLISM WITHOUT ACUTE COR PULMONALE, UNSPECIFIED PULMONARY EMBOLISM TYPE (HCC): Primary | ICD-10-CM

## 2020-09-30 DIAGNOSIS — E78.49 OTHER HYPERLIPIDEMIA: ICD-10-CM

## 2020-09-30 DIAGNOSIS — I82.452 ACUTE DEEP VEIN THROMBOSIS (DVT) OF LEFT PERONEAL VEIN (HCC): ICD-10-CM

## 2020-09-30 DIAGNOSIS — I26.99 PULMONARY INFARCT (HCC): ICD-10-CM

## 2020-09-30 PROCEDURE — 90694 VACC AIIV4 NO PRSRV 0.5ML IM: CPT | Performed by: INTERNAL MEDICINE

## 2020-09-30 PROCEDURE — 99495 TRANSJ CARE MGMT MOD F2F 14D: CPT | Performed by: INTERNAL MEDICINE

## 2020-09-30 PROCEDURE — 90471 IMMUNIZATION ADMIN: CPT | Performed by: INTERNAL MEDICINE

## 2020-09-30 NOTE — PROGRESS NOTES
Transitional Care Follow Up Visit  Subjective     Hector Kam is a 65 y.o. male who presents for a transitional care management visit.    Within 48 business hours after discharge our office contacted him via telephone to coordinate his care and needs.      I reviewed and discussed the details of that call along with the discharge summary, hospital problems, inpatient lab results, inpatient diagnostic studies, and consultation reports with Hector.     Current outpatient and discharge medications have been reconciled for the patient.  Reviewed by: Nelson Mccoy MD      Date of TCM Phone Call 9/17/2020   [facility]   Date of Admission 9/15/2020   Date of Discharge 9/17/2020   Discharge Disposition Home or Self Care     Risk for Readmission (LACE) Score: 2 (9/17/2020  6:00 AM)      History of Present Illness   Course During Hospital Stay: Joe is here today for hospital follow-up.  He was admitted to [facility] on 15 September.  He was discharged on 17 September.  He was diagnosed with a pulmonary embolus primarily right-sided.  He had a deep vein and superficial vein thrombosis in the left leg.  He is on some Eliquis.  There is also a little bit of pulmonary infarction noted.  He seems to be doing well.  His oxygen level is 96% today.  At his physical exam a year ago was 99%.  He did have a CT angiogram that showed the above-mentioned pulmonary embolus.  There was no other underlying reason for pulmonary emboli and some such as a lung malignancy.  He has had a colonoscopy approximately 5 years ago and there was no cancer noted then.  His PSA last year was normal.  He has not had any recent trauma.  He has been a little more immobile since the pandemic.     The following portions of the patient's history were reviewed and updated as appropriate: allergies, current medications, past family history, past medical history, past social history, past surgical history and problem list.    Review  of Systems   Constitutional: Negative for chills and fever.   Respiratory: Negative for cough.    Cardiovascular: Negative for chest pain.       Objective   Physical Exam  Vitals signs and nursing note reviewed.   Constitutional:       Appearance: He is well-developed.   Neck:      Thyroid: No thyromegaly.      Vascular: No carotid bruit.   Cardiovascular:      Rate and Rhythm: Normal rate and regular rhythm.      Heart sounds: Normal heart sounds. No murmur. No friction rub. No gallop.    Pulmonary:      Effort: Pulmonary effort is normal. No respiratory distress.      Breath sounds: Normal breath sounds. No wheezing or rales.   Abdominal:      General: Bowel sounds are normal. There is no distension.      Palpations: Abdomen is soft.      Tenderness: There is no abdominal tenderness. There is no guarding or rebound.         Assessment/Plan   Hector was seen today for pulmonary embolism.    Diagnoses and all orders for this visit:    Acute pulmonary embolism without acute cor pulmonale, unspecified pulmonary embolism type (CMS/HCC)    Acute deep vein thrombosis (DVT) of left peroneal vein (CMS/HCC)  -     PSA DIAGNOSTIC; Future    Pulmonary infarct (CMS/HCC)  -     PSA DIAGNOSTIC; Future    Other hyperlipidemia  -     Lipid Panel; Future    Other orders  -     Fluad Quad 65+ yrs ([id number])    Joe is here today for follow-up after hospital visit.  He seems to be doing okay.  We did go over his lab results as well as his echocardiogram showing no evidence of right ventricular strain.  We did discuss his venous Doppler and chest x-ray and CT angiogram.  I am going to have him come in for some fasting lab work to look at his cholesterol.  We will repeat a PSA.  I did advise that with no known cause we may want to keep him on the anticoagulant for at least 6 months.  He is going to see the oncologist back in January for follow-up venous Doppler.

## 2020-10-05 ENCOUNTER — RESULTS ENCOUNTER (OUTPATIENT)
Dept: FAMILY MEDICINE CLINIC | Facility: CLINIC | Age: 65
End: 2020-10-05

## 2020-10-05 DIAGNOSIS — E78.49 OTHER HYPERLIPIDEMIA: ICD-10-CM

## 2020-10-05 DIAGNOSIS — I82.452 ACUTE DEEP VEIN THROMBOSIS (DVT) OF LEFT PERONEAL VEIN (HCC): ICD-10-CM

## 2020-10-05 DIAGNOSIS — I26.99 PULMONARY INFARCT (HCC): ICD-10-CM

## 2020-10-16 ENCOUNTER — CLINICAL SUPPORT (OUTPATIENT)
Dept: FAMILY MEDICINE CLINIC | Facility: CLINIC | Age: 65
End: 2020-10-16

## 2020-10-16 DIAGNOSIS — Z23 NEED FOR VACCINATION: Primary | ICD-10-CM

## 2020-10-16 PROCEDURE — 90732 PPSV23 VACC 2 YRS+ SUBQ/IM: CPT | Performed by: INTERNAL MEDICINE

## 2020-10-16 PROCEDURE — 90471 IMMUNIZATION ADMIN: CPT | Performed by: INTERNAL MEDICINE

## 2020-10-16 NOTE — PROGRESS NOTES
I have reviewed the notes, assessments, and/or procedures performed by vadim Mayes, I concur with her/his documentation of Hector Kam.

## 2020-10-16 NOTE — PROGRESS NOTES
Injection  Injection performed in office by Adrienne Mayes MA. Patient tolerated the procedure well without complications.  10/16/20   Adrienne Mayes MA

## 2020-10-17 LAB
CHOLEST SERPL-MCNC: 198 MG/DL (ref 0–200)
HDLC SERPL-MCNC: 50 MG/DL (ref 40–60)
LDLC SERPL CALC-MCNC: 127 MG/DL (ref 0–100)
PSA SERPL-MCNC: 2.33 NG/ML (ref 0–4)
TRIGL SERPL-MCNC: 115 MG/DL (ref 0–150)
VLDLC SERPL CALC-MCNC: 21 MG/DL (ref 5–40)

## 2020-11-11 DIAGNOSIS — Z13.6 SCREENING FOR AAA (ABDOMINAL AORTIC ANEURYSM): Primary | ICD-10-CM

## 2021-01-04 ENCOUNTER — HOSPITAL ENCOUNTER (OUTPATIENT)
Dept: ULTRASOUND IMAGING | Facility: HOSPITAL | Age: 66
Discharge: HOME | End: 2021-01-04

## 2021-01-04 ENCOUNTER — HOSPITAL ENCOUNTER (OUTPATIENT)
Dept: CARDIOLOGY | Facility: HOSPITAL | Age: 66
Discharge: HOME | End: 2021-01-04

## 2021-01-04 DIAGNOSIS — Z13.6 SCREENING FOR AAA (ABDOMINAL AORTIC ANEURYSM): ICD-10-CM

## 2021-01-04 DIAGNOSIS — I26.99 ACUTE PULMONARY EMBOLISM WITHOUT ACUTE COR PULMONALE, UNSPECIFIED PULMONARY EMBOLISM TYPE (HCC): ICD-10-CM

## 2021-01-04 DIAGNOSIS — I82.452 ACUTE DEEP VEIN THROMBOSIS (DVT) OF LEFT PERONEAL VEIN (HCC): ICD-10-CM

## 2021-01-04 LAB
BH CV LOW VAS LEFT GREATER SAPH AK VESSEL: 1
BH CV LOW VAS LEFT GREATER SAPH BK VESSEL: 1
BH CV LOW VAS LEFT PERONEAL VESSEL: 1
BH CV LOW VAS LEFT SOLEAL VESSEL: 1
BH CV LOWER VASCULAR LEFT COMMON FEMORAL AUGMENT: NORMAL
BH CV LOWER VASCULAR LEFT COMMON FEMORAL COMPETENT: NORMAL
BH CV LOWER VASCULAR LEFT COMMON FEMORAL COMPRESS: NORMAL
BH CV LOWER VASCULAR LEFT COMMON FEMORAL PHASIC: NORMAL
BH CV LOWER VASCULAR LEFT COMMON FEMORAL SPONT: NORMAL
BH CV LOWER VASCULAR LEFT DISTAL FEMORAL COMPRESS: NORMAL
BH CV LOWER VASCULAR LEFT GASTRONEMIUS COMPRESS: NORMAL
BH CV LOWER VASCULAR LEFT GREATER SAPH AK COMPRESS: NORMAL
BH CV LOWER VASCULAR LEFT GREATER SAPH AK THROMBUS: NORMAL
BH CV LOWER VASCULAR LEFT GREATER SAPH BK COMPRESS: NORMAL
BH CV LOWER VASCULAR LEFT GREATER SAPH BK THROMBUS: NORMAL
BH CV LOWER VASCULAR LEFT LESSER SAPH COMPRESS: NORMAL
BH CV LOWER VASCULAR LEFT MID FEMORAL AUGMENT: NORMAL
BH CV LOWER VASCULAR LEFT MID FEMORAL COMPETENT: NORMAL
BH CV LOWER VASCULAR LEFT MID FEMORAL COMPRESS: NORMAL
BH CV LOWER VASCULAR LEFT MID FEMORAL PHASIC: NORMAL
BH CV LOWER VASCULAR LEFT MID FEMORAL SPONT: NORMAL
BH CV LOWER VASCULAR LEFT PERFORATOR COMPRESS: NORMAL
BH CV LOWER VASCULAR LEFT PERONEAL COMPRESS: NORMAL
BH CV LOWER VASCULAR LEFT PERONEAL THROMBUS: NORMAL
BH CV LOWER VASCULAR LEFT POPLITEAL AUGMENT: NORMAL
BH CV LOWER VASCULAR LEFT POPLITEAL COMPETENT: NORMAL
BH CV LOWER VASCULAR LEFT POPLITEAL COMPRESS: NORMAL
BH CV LOWER VASCULAR LEFT POPLITEAL PHASIC: NORMAL
BH CV LOWER VASCULAR LEFT POPLITEAL SPONT: NORMAL
BH CV LOWER VASCULAR LEFT POSTERIOR TIBIAL COMPRESS: NORMAL
BH CV LOWER VASCULAR LEFT PROFUNDA FEMORAL COMPRESS: NORMAL
BH CV LOWER VASCULAR LEFT PROXIMAL FEMORAL COMPRESS: NORMAL
BH CV LOWER VASCULAR LEFT SAPHENOFEMORAL JUNCTION COMPRESS: NORMAL
BH CV LOWER VASCULAR LEFT SOLEAL COMPRESS: NORMAL
BH CV LOWER VASCULAR LEFT SOLEAL THROMBUS: NORMAL
BH CV LOWER VASCULAR RIGHT COMMON FEMORAL AUGMENT: NORMAL
BH CV LOWER VASCULAR RIGHT COMMON FEMORAL COMPETENT: NORMAL
BH CV LOWER VASCULAR RIGHT COMMON FEMORAL COMPRESS: NORMAL
BH CV LOWER VASCULAR RIGHT COMMON FEMORAL PHASIC: NORMAL
BH CV LOWER VASCULAR RIGHT COMMON FEMORAL SPONT: NORMAL

## 2021-01-04 PROCEDURE — 93971 EXTREMITY STUDY: CPT

## 2021-01-04 PROCEDURE — 76706 US ABDL AORTA SCREEN AAA: CPT

## 2021-01-05 NOTE — PROGRESS NOTES
"[facility] GROUP OUTPATIENT FOLLOW UP CLINIC VISIT    REASON FOR FOLLOW-UP:    Acute right lung pulmonary emboli diagnosed by CTA 9/15/2020  Acute left lower extremity DVT in the peroneal and soleal veins 9/15/2020    HISTORY OF PRESENT ILLNESS:  Hector Kam is a 65 y.o. male who returns today for follow up of the above issue.      He feels well.  He has not been able to go to the gym and exercise.  He is a lot more sedentary than he would like to be.  His left leg is overall doing better.  No chest pain or shortness of breath.        ALLERGIES:  No Known Allergies    MEDICATIONS:  The medication list has been reviewed with the patient by the medical assistant, and the list has been updated in the electronic medical record, which I reviewed.  Medication dosages and frequencies were confirmed to be accurate.    REVIEW OF SYSTEMS:  Left leg discomfort improving.  No chest pain.  No fevers or chills.  No bleeding.    Vitals:    01/07/21 0914   BP: 119/87   Pulse: 93   Resp: 16   Temp: 97.5 °F (36.4 °C)   TempSrc: Temporal   SpO2: 95%   Weight: 105 kg (231 lb 9.6 oz)   Height: 185 cm (72.84\")   PainSc: 0-No pain  Comment: Acute right lung pulmonary emboli       PHYSICAL EXAMINATION:  GENERAL:  Well-developed well-nourished male; awake, alert and oriented, in no acute distress.  SKIN:  Warm and dry, without rashes, purpura, or petechiae.  HEAD:  Normocephalic, atraumatic. Wearing a facemask.  EARS:  Hearing intact.  CHEST:  Lungs are clear to auscultation bilaterally.  No wheezes, rales, or rhonchi.  HEART:  Regular rate; normal rhythm.  No murmurs, gallops or rubs.  EXTREMITIES:  No clubbing cyanosis or edema.  NEUROLOGICAL:  No focal neurologic deficits.    DIAGNOSTIC DATA:    CBC & Differential (01/07/2021 09:07)    IMAGING:   Duplex Venous Lower Extremity - Left CAR (01/04/2021 09:19)        ASSESSMENT:  This is a 65 y.o. male with:    *Acute right-sided pulmonary emboli and left lower extremity deep vein " thrombosis diagnosed on 9/15/2020  · He was significantly less active than usual over the preceeding 6 months secondary to the COVID-19 pandemic  · Prior to this, he was exercising and walking every day  · He is now working from home and has been quite sedentary  · Otherwise, no preceding immobilization and no directly provoking events  · He is currently on Eliquis 5 mg twice daily having completed 7 days of 10 mg twice daily  · We discussed at length today whether or not to pursue a laboratory thrombophilia evaluation to evaluate for inherited or acquired reasons for venous thromboembolism.  He is not necessarily interested in pursuing this laboratory evaluation at this time.  · Repeat left lower extremity venous duplex 1/4/2021 shows subacute left lower extremity DVT in the peroneal and soleal veins with subacute left lower extremity superficial thrombophlebitis in the greater saphenous vein above and below the knee.  · We anticipate at least 6 months of anticoagulation given the presence of pulmonary emboli at diagnosis and assuming that his inactivity provoked this episode  · If at any point he wants to pursue a laboratory thrombophilia evaluation we can certainly send these labs.  He is not interested in this at this time.     *Inactivity  · He will try to increase low impact activity to try to become more active       *Chest pain related to pulmonary emboli and possible pulmonary infarction  · This has resolved on anticoagulation     PLAN:   1. Continue Eliquis 5 mg twice daily for now.  2. Repeat left lower extremity venous duplex in about 3 months and I will see him back after that with a CBC.    Prescription drug management  Orders Placed This Encounter   Procedures   • CBC & Differential     Standing Status:   Future     Standing Expiration Date:   1/7/2022     Order Specific Question:   Manual Differential     Answer:   No      Diagnosis Plan   1. Acute deep vein thrombosis (DVT) of left peroneal vein  (CMS/HCC)  CBC & Differential    Duplex Venous Lower Extremity - Left CAR

## 2021-01-07 ENCOUNTER — OFFICE VISIT (OUTPATIENT)
Dept: ONCOLOGY | Facility: CLINIC | Age: 66
End: 2021-01-07

## 2021-01-07 ENCOUNTER — LAB (OUTPATIENT)
Dept: LAB | Facility: HOSPITAL | Age: 66
End: 2021-01-07

## 2021-01-07 VITALS
OXYGEN SATURATION: 95 % | HEART RATE: 93 BPM | DIASTOLIC BLOOD PRESSURE: 87 MMHG | RESPIRATION RATE: 16 BRPM | HEIGHT: 73 IN | TEMPERATURE: 97.5 F | BODY MASS INDEX: 30.69 KG/M2 | WEIGHT: 231.6 LBS | SYSTOLIC BLOOD PRESSURE: 119 MMHG

## 2021-01-07 DIAGNOSIS — I82.452 ACUTE DEEP VEIN THROMBOSIS (DVT) OF LEFT PERONEAL VEIN (HCC): Primary | ICD-10-CM

## 2021-01-07 DIAGNOSIS — I26.99 ACUTE PULMONARY EMBOLISM WITHOUT ACUTE COR PULMONALE, UNSPECIFIED PULMONARY EMBOLISM TYPE (HCC): ICD-10-CM

## 2021-01-07 DIAGNOSIS — I82.452 ACUTE DEEP VEIN THROMBOSIS (DVT) OF LEFT PERONEAL VEIN (HCC): ICD-10-CM

## 2021-01-07 LAB
BASOPHILS # BLD AUTO: 0.04 10*3/MM3 (ref 0–0.2)
BASOPHILS NFR BLD AUTO: 0.7 % (ref 0–1.5)
DEPRECATED RDW RBC AUTO: 43.8 FL (ref 37–54)
EOSINOPHIL # BLD AUTO: 0.05 10*3/MM3 (ref 0–0.4)
EOSINOPHIL NFR BLD AUTO: 0.9 % (ref 0.3–6.2)
ERYTHROCYTE [DISTWIDTH] IN BLOOD BY AUTOMATED COUNT: 14.2 % (ref 12.3–15.4)
HCT VFR BLD AUTO: 46.5 % (ref 37.5–51)
HGB BLD-MCNC: 16.7 G/DL (ref 13–17.7)
IMM GRANULOCYTES # BLD AUTO: 0.04 10*3/MM3 (ref 0–0.05)
IMM GRANULOCYTES NFR BLD AUTO: 0.7 % (ref 0–0.5)
LYMPHOCYTES # BLD AUTO: 1.48 10*3/MM3 (ref 0.7–3.1)
LYMPHOCYTES NFR BLD AUTO: 26.6 % (ref 19.6–45.3)
MCH RBC QN AUTO: 30.9 PG (ref 26.6–33)
MCHC RBC AUTO-ENTMCNC: 35.9 G/DL (ref 31.5–35.7)
MCV RBC AUTO: 86 FL (ref 79–97)
MONOCYTES # BLD AUTO: 0.75 10*3/MM3 (ref 0.1–0.9)
MONOCYTES NFR BLD AUTO: 13.5 % (ref 5–12)
NEUTROPHILS NFR BLD AUTO: 3.21 10*3/MM3 (ref 1.7–7)
NEUTROPHILS NFR BLD AUTO: 57.6 % (ref 42.7–76)
NRBC BLD AUTO-RTO: 0 /100 WBC (ref 0–0.2)
PLATELET # BLD AUTO: 234 10*3/MM3 (ref 140–450)
PMV BLD AUTO: 10.1 FL (ref 6–12)
RBC # BLD AUTO: 5.41 10*6/MM3 (ref 4.14–5.8)
WBC # BLD AUTO: 5.57 10*3/MM3 (ref 3.4–10.8)

## 2021-01-07 PROCEDURE — 85025 COMPLETE CBC W/AUTO DIFF WBC: CPT

## 2021-01-07 PROCEDURE — 99214 OFFICE O/P EST MOD 30 MIN: CPT | Performed by: INTERNAL MEDICINE

## 2021-01-07 PROCEDURE — 36415 COLL VENOUS BLD VENIPUNCTURE: CPT

## 2021-03-10 NOTE — TELEPHONE ENCOUNTER
Please ask Susie,thanks. I'm at an office with a full schedule.   Normal vision: sees adequately in most situations; can see medication labels, newsprint

## 2021-03-19 ENCOUNTER — BULK ORDERING (OUTPATIENT)
Dept: CASE MANAGEMENT | Facility: OTHER | Age: 66
End: 2021-03-19

## 2021-03-19 DIAGNOSIS — Z23 IMMUNIZATION DUE: ICD-10-CM

## 2021-03-31 ENCOUNTER — HOSPITAL ENCOUNTER (OUTPATIENT)
Dept: CARDIOLOGY | Facility: HOSPITAL | Age: 66
Discharge: HOME | End: 2021-03-31
Admitting: INTERNAL MEDICINE

## 2021-03-31 DIAGNOSIS — I82.452 ACUTE DEEP VEIN THROMBOSIS (DVT) OF LEFT PERONEAL VEIN (HCC): ICD-10-CM

## 2021-03-31 LAB
BH CV LOW VAS LEFT GREATER SAPH AK VESSEL: 1
BH CV LOW VAS LEFT GREATER SAPH BK VESSEL: 1
BH CV LOW VAS LEFT PERONEAL VESSEL: 1
BH CV LOW VAS LEFT SOLEAL VESSEL: 1
BH CV LOWER VASCULAR LEFT COMMON FEMORAL AUGMENT: NORMAL
BH CV LOWER VASCULAR LEFT COMMON FEMORAL COMPETENT: NORMAL
BH CV LOWER VASCULAR LEFT COMMON FEMORAL COMPRESS: NORMAL
BH CV LOWER VASCULAR LEFT COMMON FEMORAL PHASIC: NORMAL
BH CV LOWER VASCULAR LEFT COMMON FEMORAL SPONT: NORMAL
BH CV LOWER VASCULAR LEFT DISTAL FEMORAL COMPRESS: NORMAL
BH CV LOWER VASCULAR LEFT GASTRONEMIUS COMPRESS: NORMAL
BH CV LOWER VASCULAR LEFT GREATER SAPH AK COMPRESS: NORMAL
BH CV LOWER VASCULAR LEFT GREATER SAPH AK THROMBUS: NORMAL
BH CV LOWER VASCULAR LEFT GREATER SAPH BK COMPRESS: NORMAL
BH CV LOWER VASCULAR LEFT GREATER SAPH BK THROMBUS: NORMAL
BH CV LOWER VASCULAR LEFT LESSER SAPH COMPRESS: NORMAL
BH CV LOWER VASCULAR LEFT MID FEMORAL AUGMENT: NORMAL
BH CV LOWER VASCULAR LEFT MID FEMORAL COMPETENT: NORMAL
BH CV LOWER VASCULAR LEFT MID FEMORAL COMPRESS: NORMAL
BH CV LOWER VASCULAR LEFT MID FEMORAL PHASIC: NORMAL
BH CV LOWER VASCULAR LEFT MID FEMORAL SPONT: NORMAL
BH CV LOWER VASCULAR LEFT PERONEAL COMPRESS: NORMAL
BH CV LOWER VASCULAR LEFT PERONEAL THROMBUS: NORMAL
BH CV LOWER VASCULAR LEFT POPLITEAL AUGMENT: NORMAL
BH CV LOWER VASCULAR LEFT POPLITEAL COMPETENT: NORMAL
BH CV LOWER VASCULAR LEFT POPLITEAL COMPRESS: NORMAL
BH CV LOWER VASCULAR LEFT POPLITEAL PHASIC: NORMAL
BH CV LOWER VASCULAR LEFT POPLITEAL SPONT: NORMAL
BH CV LOWER VASCULAR LEFT POSTERIOR TIBIAL COMPRESS: NORMAL
BH CV LOWER VASCULAR LEFT PROFUNDA FEMORAL COMPRESS: NORMAL
BH CV LOWER VASCULAR LEFT PROXIMAL FEMORAL COMPRESS: NORMAL
BH CV LOWER VASCULAR LEFT SAPHENOFEMORAL JUNCTION COMPRESS: NORMAL
BH CV LOWER VASCULAR LEFT SOLEAL COMPRESS: NORMAL
BH CV LOWER VASCULAR LEFT SOLEAL THROMBUS: NORMAL
BH CV LOWER VASCULAR RIGHT COMMON FEMORAL AUGMENT: NORMAL
BH CV LOWER VASCULAR RIGHT COMMON FEMORAL COMPETENT: NORMAL
BH CV LOWER VASCULAR RIGHT COMMON FEMORAL COMPRESS: NORMAL
BH CV LOWER VASCULAR RIGHT COMMON FEMORAL PHASIC: NORMAL
BH CV LOWER VASCULAR RIGHT COMMON FEMORAL SPONT: NORMAL

## 2021-03-31 PROCEDURE — 93971 EXTREMITY STUDY: CPT

## 2021-04-06 ENCOUNTER — OFFICE VISIT (OUTPATIENT)
Dept: ONCOLOGY | Facility: CLINIC | Age: 66
End: 2021-04-06

## 2021-04-06 ENCOUNTER — LAB (OUTPATIENT)
Dept: LAB | Facility: HOSPITAL | Age: 66
End: 2021-04-06

## 2021-04-06 VITALS
DIASTOLIC BLOOD PRESSURE: 96 MMHG | BODY MASS INDEX: 28.63 KG/M2 | HEART RATE: 98 BPM | SYSTOLIC BLOOD PRESSURE: 148 MMHG | HEIGHT: 73 IN | WEIGHT: 216 LBS | TEMPERATURE: 97.4 F | OXYGEN SATURATION: 97 % | RESPIRATION RATE: 16 BRPM

## 2021-04-06 DIAGNOSIS — I82.5Z2 LOWER LEG DVT (DEEP VENOUS THROMBOEMBOLISM), CHRONIC, LEFT (HCC): Primary | ICD-10-CM

## 2021-04-06 DIAGNOSIS — I82.452 ACUTE DEEP VEIN THROMBOSIS (DVT) OF LEFT PERONEAL VEIN (HCC): ICD-10-CM

## 2021-04-06 DIAGNOSIS — Z86.711 HISTORY OF PULMONARY EMBOLISM: ICD-10-CM

## 2021-04-06 DIAGNOSIS — R06.09 DYSPNEA ON EXERTION: ICD-10-CM

## 2021-04-06 LAB
BASOPHILS # BLD AUTO: 0.03 10*3/MM3 (ref 0–0.2)
BASOPHILS NFR BLD AUTO: 0.5 % (ref 0–1.5)
DEPRECATED RDW RBC AUTO: 44.5 FL (ref 37–54)
EOSINOPHIL # BLD AUTO: 0.05 10*3/MM3 (ref 0–0.4)
EOSINOPHIL NFR BLD AUTO: 0.9 % (ref 0.3–6.2)
ERYTHROCYTE [DISTWIDTH] IN BLOOD BY AUTOMATED COUNT: 13.6 % (ref 12.3–15.4)
HCT VFR BLD AUTO: 45 % (ref 37.5–51)
HGB BLD-MCNC: 15.9 G/DL (ref 13–17.7)
IMM GRANULOCYTES # BLD AUTO: 0.03 10*3/MM3 (ref 0–0.05)
IMM GRANULOCYTES NFR BLD AUTO: 0.5 % (ref 0–0.5)
LYMPHOCYTES # BLD AUTO: 1.3 10*3/MM3 (ref 0.7–3.1)
LYMPHOCYTES NFR BLD AUTO: 23.2 % (ref 19.6–45.3)
MCH RBC QN AUTO: 31.8 PG (ref 26.6–33)
MCHC RBC AUTO-ENTMCNC: 35.3 G/DL (ref 31.5–35.7)
MCV RBC AUTO: 90 FL (ref 79–97)
MONOCYTES # BLD AUTO: 0.62 10*3/MM3 (ref 0.1–0.9)
MONOCYTES NFR BLD AUTO: 11.1 % (ref 5–12)
NEUTROPHILS NFR BLD AUTO: 3.57 10*3/MM3 (ref 1.7–7)
NEUTROPHILS NFR BLD AUTO: 63.8 % (ref 42.7–76)
NRBC BLD AUTO-RTO: 0 /100 WBC (ref 0–0.2)
PLATELET # BLD AUTO: 218 10*3/MM3 (ref 140–450)
PMV BLD AUTO: 10.6 FL (ref 6–12)
RBC # BLD AUTO: 5 10*6/MM3 (ref 4.14–5.8)
WBC # BLD AUTO: 5.6 10*3/MM3 (ref 3.4–10.8)

## 2021-04-06 PROCEDURE — 85025 COMPLETE CBC W/AUTO DIFF WBC: CPT

## 2021-04-06 PROCEDURE — 36415 COLL VENOUS BLD VENIPUNCTURE: CPT

## 2021-04-06 PROCEDURE — 99214 OFFICE O/P EST MOD 30 MIN: CPT | Performed by: INTERNAL MEDICINE

## 2021-04-06 NOTE — PROGRESS NOTES
"[facility] GROUP OUTPATIENT FOLLOW UP CLINIC VISIT    REASON FOR FOLLOW-UP:    Acute right lung pulmonary emboli diagnosed by CTA 9/15/2020  Acute left lower extremity DVT in the peroneal and soleal veins 9/15/2020    HISTORY OF PRESENT ILLNESS:  Hector Kam is a 65 y.o. male who returns today for follow up of the above issue.      He is generally doing well.  He tolerates Eliquis well but still complains of some dyspnea on exertion which is mild.  He denies any left leg pain or swelling.  Superficial phlebitis has improved.       REVIEW OF SYSTEMS:   Denies left leg pain.  Mild fatigue.  Mild dyspnea on exertion.    Vitals:    04/06/21 0843   BP: 148/96   Pulse: 98   Resp: 16   Temp: 97.4 °F (36.3 °C)   TempSrc: Temporal   SpO2: 97%   Weight: 98 kg (216 lb)   Height: 185 cm (72.84\")   PainSc: 0-No pain       PHYSICAL EXAMINATION:  GENERAL:  Well-developed well-nourished male; awake, alert and oriented, in no acute distress.  SKIN:  Warm and dry, without rashes, purpura, or petechiae.  HEAD:  Normocephalic, atraumatic. Wearing a facemask.  EARS:  Hearing intact.  CHEST:  Lungs are clear to auscultation bilaterally.  No wheezes, rales, or rhonchi.  HEART:  Regular rate; normal rhythm.  No murmurs, gallops or rubs.  EXTREMITIES:  No clubbing cyanosis or edema.  Palpable superficial cord just proximal to the medial aspect of the left knee.  NEUROLOGICAL:  No focal neurologic deficits.    DIAGNOSTIC DATA:  CBC & Differential (04/06/2021 08:36)        IMAGING:   Duplex Venous Lower Extremity - Left CAR (03/31/2021 09:04)          ASSESSMENT:  This is a 65 y.o. male with:    *Acute right-sided pulmonary emboli and left lower extremity deep vein thrombosis diagnosed on 9/15/2020  · He was significantly less active than usual over the preceeding 6 months secondary to the COVID-19 pandemic  · Prior to this, he was exercising and walking every day  · He is now working from home and has been quite " sedentary  · Otherwise, no preceding immobilization and no directly provoking events  · He is currently on Eliquis 5 mg twice daily having completed 7 days of 10 mg twice daily  · We discussed at length today whether or not to pursue a laboratory thrombophilia evaluation to evaluate for inherited or acquired reasons for venous thromboembolism.  He is not necessarily interested in pursuing this laboratory evaluation at this time.  · Repeat left lower extremity venous duplex 1/4/2021 with subacute left lower extremity DVT in the peroneal and soleal veins with subacute left lower extremity superficial thrombophlebitis in the greater saphenous vein above and below the knee.  · Repeat LLE duplex 3/31/2021 with chronic LLE DVT in the peroneal and soleal veins, subacute LLE superficial thrombophlebitis in the greater saphenous vein  · No laboratory thrombophilia evaluation pursued at his request  · Eliquis discontinued as of 4/6/2021 and he will start aspirin 81 mg daily.     *Superficial thrombophlebitis in the greater saphenous vein above the knee and below the knee  · He states that symptoms are improving and the palpable cord above the knee is resolving    *Inactivity  · He is working to increase his activity level      *Chest pain related to pulmonary emboli and possible pulmonary infarction  · This has resolved on anticoagulation     *Mild dyspnea on exertion  · This persists and may be related to inactivity or his history of pulmonary embolism or medication.  Anticipate that this will improve over time.  If it does not, repeat CT angiogram of the chest.    PLAN:   1. Discontinue Eliquis  2. Start aspirin 81 mg daily  3. Wear compression stockings for long periods of immobilization  4. He does have a small supply of Eliquis at home.  I advised him that if he develops any symptoms consistent with DVT or PE to take Eliquis and present urgently for further evaluation  5. No follow-up will be scheduled but we are  certainly available at any point to see him as needed.     Diagnosis Plan   1. Lower leg DVT (deep venous thromboembolism), chronic, left (CMS/HCC)     2. History of pulmonary embolism

## 2021-10-29 ENCOUNTER — OFFICE VISIT (OUTPATIENT)
Dept: FAMILY MEDICINE CLINIC | Facility: CLINIC | Age: 66
End: 2021-10-29

## 2021-10-29 VITALS
HEIGHT: 73 IN | BODY MASS INDEX: 28.23 KG/M2 | WEIGHT: 213 LBS | SYSTOLIC BLOOD PRESSURE: 110 MMHG | DIASTOLIC BLOOD PRESSURE: 70 MMHG | OXYGEN SATURATION: 98 % | HEART RATE: 83 BPM

## 2021-10-29 DIAGNOSIS — Z00.00 ANNUAL PHYSICAL EXAM: Primary | ICD-10-CM

## 2021-10-29 DIAGNOSIS — Z12.5 SCREENING PSA (PROSTATE SPECIFIC ANTIGEN): ICD-10-CM

## 2021-10-29 PROBLEM — I26.99 ACUTE PULMONARY EMBOLISM WITHOUT ACUTE COR PULMONALE: Status: RESOLVED | Noted: 2020-09-15 | Resolved: 2021-10-29

## 2021-10-29 PROBLEM — R06.09 DYSPNEA ON EXERTION: Status: RESOLVED | Noted: 2021-04-06 | Resolved: 2021-10-29

## 2021-10-29 PROBLEM — I82.5Z2 LOWER LEG DVT (DEEP VENOUS THROMBOEMBOLISM), CHRONIC, LEFT (HCC): Status: RESOLVED | Noted: 2021-04-06 | Resolved: 2021-10-29

## 2021-10-29 PROBLEM — I82.409 ACUTE DEEP VEIN THROMBOSIS (DVT) OF LOWER EXTREMITY (HCC): Status: RESOLVED | Noted: 2020-09-15 | Resolved: 2021-10-29

## 2021-10-29 PROBLEM — I26.99 PULMONARY INFARCT (HCC): Status: RESOLVED | Noted: 2020-09-17 | Resolved: 2021-10-29

## 2021-10-29 PROCEDURE — 90662 IIV NO PRSV INCREASED AG IM: CPT | Performed by: INTERNAL MEDICINE

## 2021-10-29 PROCEDURE — 99397 PER PM REEVAL EST PAT 65+ YR: CPT | Performed by: INTERNAL MEDICINE

## 2021-10-29 PROCEDURE — 90471 IMMUNIZATION ADMIN: CPT | Performed by: INTERNAL MEDICINE

## 2021-10-29 RX ORDER — ASPIRIN 81 MG/1
81 TABLET ORAL DAILY
COMMUNITY

## 2021-10-29 NOTE — PROGRESS NOTES
Subjective Chief complaint is annual exam  Hector Kam is a 66 y.o. male.     History of Present Illness Joe is here today for his annual exam.  He basically has been feeling well.  He is now off the Eliquis for his prior DVT and pulmonary embolus.  He does take a baby aspirin daily and is not having any complications from that.  He has had his Covid vaccines.  He needs his flu shot today.  Otherwise he is up-to-date on vaccines.    The following portions of the patient's history were reviewed and updated as appropriate: allergies, current medications, past family history, past medical history, past social history, past surgical history and problem list.    Review of Systems   Constitutional: Negative for chills, fatigue, fever, unexpected weight gain and unexpected weight loss.   HENT: Positive for nosebleeds and tinnitus. Negative for congestion, ear pain, hearing loss, sore throat and trouble swallowing.    Eyes: Negative for blurred vision, double vision and visual disturbance.   Respiratory: Negative for cough, chest tightness and shortness of breath.    Cardiovascular: Negative for chest pain, palpitations and leg swelling.   Gastrointestinal: Negative for abdominal pain and blood in stool.        He does have occasional problems with hemorrhoids.   Genitourinary: Positive for nocturia. Negative for dysuria and hematuria.        He does have nocturia usually approximately once per night.  Sometimes if he drinks tea later at night he gets up more often.   Musculoskeletal: Negative for arthralgias and myalgias.   Skin:        Does have a prior basal cell skin cancer removed   Neurological: Negative for dizziness, weakness, light-headedness, numbness and headache.   Hematological: Does not bruise/bleed easily.       Objective   Physical Exam  Vitals and nursing note reviewed.   Constitutional:       Appearance: He is well-developed.   HENT:      Head: Normocephalic and atraumatic.      Right Ear: Tympanic  membrane, ear canal and external ear normal.      Left Ear: Tympanic membrane, ear canal and external ear normal.      Nose: Nose normal.      Mouth/Throat:      Mouth: Mucous membranes are moist.      Pharynx: Oropharynx is clear.   Eyes:      General: No scleral icterus.     Conjunctiva/sclera: Conjunctivae normal.      Pupils: Pupils are equal, round, and reactive to light.   Neck:      Thyroid: No thyromegaly.      Vascular: No JVD.      Trachea: No tracheal deviation.   Cardiovascular:      Rate and Rhythm: Normal rate and regular rhythm.      Heart sounds: Normal heart sounds. No murmur heard.  No friction rub. No gallop.    Pulmonary:      Effort: Pulmonary effort is normal. No respiratory distress.      Breath sounds: Normal breath sounds. No wheezing or rales.   Abdominal:      General: Bowel sounds are normal. There is no distension.      Palpations: Abdomen is soft. There is no mass.      Tenderness: There is no abdominal tenderness. There is no guarding or rebound.      Hernia: No hernia is present.   Genitourinary:     Rectum: Normal.      Comments: Prostate is minimally enlarged but no firmness or nodules  Musculoskeletal:         General: Normal range of motion.      Cervical back: Neck supple.   Lymphadenopathy:      Cervical: No cervical adenopathy.   Skin:     General: Skin is warm and dry.   Neurological:      Mental Status: He is alert.      Cranial Nerves: No cranial nerve deficit.      Coordination: Coordination normal.      Deep Tendon Reflexes: Reflexes are normal and symmetric.   Psychiatric:         Mood and Affect: Mood normal.         Behavior: Behavior normal.           Assessment/Plan   Diagnoses and all orders for this visit:    1. Annual physical exam (Primary)    Joe is here today for his annual exam.  He is up to speed on his vaccines.  He is going to receive a flu shot today.  We did  regarding aspirin therapy.  He is not taking it for primary prevention.  His oncologist  has a monitor for prior DVT and pulmonary embolus.

## 2021-10-30 LAB
25(OH)D3+25(OH)D2 SERPL-MCNC: 21.9 NG/ML (ref 30–100)
ALBUMIN SERPL-MCNC: 4.2 G/DL (ref 3.8–4.8)
ALBUMIN/GLOB SERPL: 1.6 {RATIO} (ref 1.2–2.2)
ALP SERPL-CCNC: 98 IU/L (ref 44–121)
ALT SERPL-CCNC: 21 IU/L (ref 0–44)
AST SERPL-CCNC: 25 IU/L (ref 0–40)
BASOPHILS # BLD AUTO: 0 X10E3/UL (ref 0–0.2)
BASOPHILS NFR BLD AUTO: 1 %
BILIRUB SERPL-MCNC: 0.6 MG/DL (ref 0–1.2)
BUN SERPL-MCNC: 13 MG/DL (ref 8–27)
BUN/CREAT SERPL: 13 (ref 10–24)
CALCIUM SERPL-MCNC: 9 MG/DL (ref 8.6–10.2)
CHLORIDE SERPL-SCNC: 105 MMOL/L (ref 96–106)
CHOLEST SERPL-MCNC: 218 MG/DL (ref 100–199)
CO2 SERPL-SCNC: 22 MMOL/L (ref 20–29)
CREAT SERPL-MCNC: 0.97 MG/DL (ref 0.76–1.27)
EOSINOPHIL # BLD AUTO: 0 X10E3/UL (ref 0–0.4)
EOSINOPHIL NFR BLD AUTO: 1 %
ERYTHROCYTE [DISTWIDTH] IN BLOOD BY AUTOMATED COUNT: 13 % (ref 11.6–15.4)
GLOBULIN SER CALC-MCNC: 2.7 G/DL (ref 1.5–4.5)
GLUCOSE SERPL-MCNC: 94 MG/DL (ref 65–99)
HBA1C MFR BLD: 5.5 % (ref 4.8–5.6)
HCT VFR BLD AUTO: 45.1 % (ref 37.5–51)
HDLC SERPL-MCNC: 65 MG/DL
HGB BLD-MCNC: 15.6 G/DL (ref 13–17.7)
IMM GRANULOCYTES # BLD AUTO: 0 X10E3/UL (ref 0–0.1)
IMM GRANULOCYTES NFR BLD AUTO: 0 %
LDLC SERPL CALC-MCNC: 141 MG/DL (ref 0–99)
LYMPHOCYTES # BLD AUTO: 0.9 X10E3/UL (ref 0.7–3.1)
LYMPHOCYTES NFR BLD AUTO: 24 %
MCH RBC QN AUTO: 32 PG (ref 26.6–33)
MCHC RBC AUTO-ENTMCNC: 34.6 G/DL (ref 31.5–35.7)
MCV RBC AUTO: 92 FL (ref 79–97)
MONOCYTES # BLD AUTO: 0.5 X10E3/UL (ref 0.1–0.9)
MONOCYTES NFR BLD AUTO: 13 %
NEUTROPHILS # BLD AUTO: 2.3 X10E3/UL (ref 1.4–7)
NEUTROPHILS NFR BLD AUTO: 61 %
PLATELET # BLD AUTO: 223 X10E3/UL (ref 150–450)
POTASSIUM SERPL-SCNC: 4.4 MMOL/L (ref 3.5–5.2)
PROT SERPL-MCNC: 6.9 G/DL (ref 6–8.5)
PSA SERPL-MCNC: 2 NG/ML (ref 0–4)
RBC # BLD AUTO: 4.88 X10E6/UL (ref 4.14–5.8)
SODIUM SERPL-SCNC: 140 MMOL/L (ref 134–144)
T4 FREE SERPL-MCNC: 1.32 NG/DL (ref 0.82–1.77)
TRIGL SERPL-MCNC: 66 MG/DL (ref 0–149)
TSH SERPL DL<=0.005 MIU/L-ACNC: 1.56 UIU/ML (ref 0.45–4.5)
VLDLC SERPL CALC-MCNC: 12 MG/DL (ref 5–40)
WBC # BLD AUTO: 3.7 X10E3/UL (ref 3.4–10.8)

## 2021-12-15 NOTE — PROGRESS NOTES
Patient was counseled extensively on Eliquis including the followin) Eliquis's indication, mechanism of action, and dosing  2) Enforced the importance of taking Eliquis as instructed every day and that it is a twice a day medication.  3) Explained possible side effects of Eliquis therapy, including increased risk of bleeding, s/sx of bleeding, and s/sx of any additional clots/PE/CVA.   4) Emphasized the importance of going to the emergency room if any of the following occur: Falling and hitting your head; noticing bright red blood in urine or dark/tarry stools; vomiting up blood or vomit has a coffee-ground like texture; coughing up blood  5) Discussed the importance of speaking with physician/pharmacist when starting any new medications to check for drug interactions with Eliquis  6) Discussed the importance of informing any surgeon or proceduralist that you are on Eliquis and may need to go off prior to the procedure (including spinal/epidural procedures)  7) Discussed what to do about a missed dose (Take as soon as you remember and if it is closer to the time for your next dose, skip the missed dose and go back to your normal time; DO NOT double up doses)  8) Instructed the pt not to take or discontinue any medications without informing his physician/pharmacist     I also explained to the patient that this medication may have a high copay associated with it and to let the cardiologist know if it is unaffordable.     Patient expressed understanding and had no further questions.       Quentin Purvis [facility]   Please abstract the following data from this visit with this patient into the appropriate field in Epic:    Tests that can be patient reported without a hard copy:    Mammogram done on this date: 12/13/2021 (approximately), by this group: [facility], results were Normal.

## 2022-10-01 ENCOUNTER — HOSPITAL ENCOUNTER (EMERGENCY)
Facility: HOSPITAL | Age: 67
Discharge: HOME | End: 2022-10-02
Attending: EMERGENCY MEDICINE | Admitting: EMERGENCY MEDICINE

## 2022-10-01 VITALS
HEIGHT: 73 IN | DIASTOLIC BLOOD PRESSURE: 100 MMHG | SYSTOLIC BLOOD PRESSURE: 144 MMHG | OXYGEN SATURATION: 96 % | TEMPERATURE: 97.3 F | HEART RATE: 80 BPM | WEIGHT: 200 LBS | RESPIRATION RATE: 18 BRPM | BODY MASS INDEX: 26.51 KG/M2

## 2022-10-01 DIAGNOSIS — S01.312A LACERATION OF LEFT EARLOBE, INITIAL ENCOUNTER: Primary | ICD-10-CM

## 2022-10-01 PROCEDURE — 99282 EMERGENCY DEPT VISIT SF MDM: CPT

## 2022-10-01 RX ADMIN — LIDOCAINE HYDROCHLORIDE 1 ML: 10; .005 INJECTION, SOLUTION EPIDURAL; INFILTRATION; INTRACAUDAL; PERINEURAL at 23:58

## 2022-10-02 RX ORDER — CEPHALEXIN 500 MG/1
500 CAPSULE ORAL 3 TIMES DAILY
Qty: 15 CAPSULE | Refills: 0 | Status: SHIPPED | OUTPATIENT
Start: 2022-10-02 | End: 2022-10-07

## 2022-10-02 NOTE — ED PROVIDER NOTES
EMERGENCY DEPARTMENT ENCOUNTER    Room Number:  18/18  Date seen:  10/2/2022  PCP: Nelson Mccoy MD  Historian: Patient      HPI:  Chief Complaint: Ear laceration  A complete HPI/ROS/PMH/PSH/SH/FH are unobtainable due to: N/A  Context: Hector Kam is a 67 y.o. male who presents to the ED c/o an accidental laceration injury to his left ear.  Patient was walking outside on an uneven brick pathway surface.  Unfortunately was poorly lit and so he stumbled on a brick and fell forward into a tree.  He struck his left ear against a tree which caused an immediate injury with some bleeding and tenderness.  He denies any other areas of injury or concern.  He did not have any loss of consciousness.        PAST MEDICAL HISTORY  Active Ambulatory Problems     Diagnosis Date Noted   • Annual physical exam 11/15/2019   • Other hyperlipidemia 09/30/2020     Resolved Ambulatory Problems     Diagnosis Date Noted   • Acute pulmonary embolism without acute cor pulmonale (HCC) 09/15/2020   • Acute deep vein thrombosis (DVT) of lower extremity (HCC) 09/15/2020   • Pulmonary infarct (HCC) 09/17/2020   • Lower leg DVT (deep venous thromboembolism), chronic, left (HCC) 04/06/2021   • Dyspnea on exertion 04/06/2021     Past Medical History:   Diagnosis Date   • Cervical disc disease    • DVT, lower extremity (HCC)    • Pulmonary embolism (HCC) 2020         PAST SURGICAL HISTORY  Past Surgical History:   Procedure Laterality Date   • BASAL CELL CARCINOMA EXCISION      2020   • COLONOSCOPY      2018         FAMILY HISTORY  Family History   Problem Relation Age of Onset   • Alcohol abuse Father    • Cancer Father         Bladder   • Thyroid disease Sister    • Prostate cancer Paternal Grandfather          SOCIAL HISTORY  Social History     Socioeconomic History   • Marital status:      Spouse name: Shayy   Tobacco Use   • Smoking status: Former Smoker     Packs/day: 1.00     Years: 5.00     Pack years: 5.00   • Smokeless  tobacco: Never Used   Substance and Sexual Activity   • Alcohol use: Yes     Comment: 3-4 a week.   • Drug use: No   • Sexual activity: Yes     Partners: Female     Comment: wife.         ALLERGIES  Patient has no known allergies.        REVIEW OF SYSTEMS  Review of Systems   Constitutional: Negative for activity change and fever.   HENT: Positive for ear pain. Negative for dental problem and facial swelling.    Eyes: Negative for pain.   Respiratory: Negative for cough and shortness of breath.    Cardiovascular: Negative for chest pain.   Gastrointestinal: Negative for abdominal pain and vomiting.   Skin: Positive for wound. Negative for color change.   Neurological: Negative for syncope and headaches.   All other systems reviewed and are negative.      PHYSICAL EXAM  ED Triage Vitals [10/01/22 2305]   Temp Heart Rate Resp BP SpO2   97.3 °F (36.3 °C) 80 18 144/100 96 %      Temp src Heart Rate Source Patient Position BP Location FiO2 (%)   -- -- -- -- --         GENERAL: Pleasant gentleman, resting comfortably in the bed, no acute distress  HENT: nares patent, normocephalic.  Moist mucous membranes.  Facial soft tissues appear normal and uninjured.  The left ear demonstrates a laceration injury to the superior aspect.  Laceration extends in the margin between the superior aspect of the auricle and the temporal scalp.  It is approximately 6 cm in length and extends into the subcutaneous layer.  There are no foreign bodies evident within the wound.  There is fresh blood present but no active bleeding evident.  The area is mildly tender to palpation.  EYES: no scleral icterus, EOMI, PERRL  CV: regular rhythm, normal rate  RESPIRATORY: normal effort, no stridor  MUSCULOSKELETAL: no deformity  NEURO: alert, moves all extremities, follows commands  PSYCH:  calm, cooperative  SKIN: warm, dry    Vital signs and nursing notes reviewed.          LAB RESULTS  No results found for this or any previous visit (from the past 24  hour(s)).    Ordered the above labs and reviewed the results.        RADIOLOGY  No Radiology Exams Resulted Within Past 24 Hours    Ordered the above noted radiological studies. Reviewed by me in PACS.            PROCEDURES  Laceration Repair    Date/Time: 10/2/2022 12:55 AM  Performed by: Ellis Etienne MD  Authorized by: Ellis Etienne MD     Consent:     Consent obtained:  Verbal    Consent given by:  Patient    Risks discussed:  Infection, pain, poor cosmetic result and retained foreign body  Universal protocol:     Procedure explained and questions answered to patient or proxy's satisfaction: yes      Patient identity confirmed:  Verbally with patient  Anesthesia:     Anesthesia method:  Local infiltration    Local anesthetic:  Lidocaine 1% WITH epi  Laceration details:     Location: Left superior ear.    Length (cm):  6  Pre-procedure details:     Preparation:  Patient was prepped and draped in usual sterile fashion  Exploration:     Hemostasis achieved with:  Direct pressure    Wound exploration: entire depth of wound visualized      Wound extent: no foreign bodies/material noted, no nerve damage noted, no tendon damage noted, no underlying fracture noted and no vascular damage noted      Contaminated: no    Treatment:     Area cleansed with:  Saline    Amount of cleaning:  Standard    Irrigation solution:  Sterile saline    Irrigation volume:  500ml    Irrigation method:  Pressure wash  Skin repair:     Repair method:  Sutures    Suture size:  4-0    Wound skin closure material used: Vicryl.    Suture technique:  Simple interrupted    Number of sutures:  8  Approximation:     Approximation:  Close  Repair type:     Repair type:  Simple  Post-procedure details:     Dressing:  Open (no dressing)    Procedure completion:  Tolerated well, no immediate complications              MEDICATIONS GIVEN IN ER  Medications   lidocaine-EPINEPHrine (XYLOCAINE W/EPI) 1 %-1:[id number] injection 1 mL (1 mL Infiltration Given  "10/1/22 5129)                   MEDICAL DECISION MAKING, PROGRESS, and CONSULTS    All labs have been independently reviewed by me.  All radiology studies have been reviewed by me and discussed with radiologist dictating the report.   EKG's independently viewed and interpreted by me.  Discussion below represents my analysis of pertinent findings related to patient's condition, differential diagnosis, treatment plan and final disposition.      ED Course as of 10/02/22 0057   Sun Oct 02, 2022   0056 Wound explored thoroughly.  No foreign body evident.  I anesthetized and irrigated the wound copiously.  Repaired the laceration with 8 absorbable sutures.  Spoke to the patient about wound care instructions this week.  Discharged home in good condition with usual \"return to ER\" instructions. [FELIX]      ED Course User Index  [FELIX] Ellis Etienne MD              Patient was placed in face mask during triage.  Patient was wearing face mask throughout encounter.  I wore personal protective equipment throughout the encounter.  Hand hygiene was performed before and after patient encounter.     DIAGNOSIS  Final diagnoses:   Laceration of left earlobe, initial encounter         DISPOSITION  Home            Latest Documented Vital Signs:  As of 00:57 EDT  BP- 144/100 HR- 80 Temp- 97.3 °F (36.3 °C) O2 sat- 96%        --    Please note that portions of this were completed with a voice recognition program.          Ellis Etienne MD  10/02/22 0057    "

## 2022-10-02 NOTE — ED TRIAGE NOTES
Pt to ER from home via [facility] with c/o L ear laceration. Pt states he was walking when he tripped over a brick and stumbled into a tree. Pt arrives with laceration to L ear. Pt denies LOC or blood thinners. Unknown last tetanus.       Pt masked in triage, staff in appropriate ppe.

## 2022-10-02 NOTE — DISCHARGE INSTRUCTIONS
Sutures will slowly dissolve over the next 7 to 10 days.  Keep wound clean.  Please return to the emergency room for worsening pain, swelling, bleeding, fevers, drainage, redness or any other concerns.

## 2023-01-12 ENCOUNTER — OFFICE VISIT (OUTPATIENT)
Dept: FAMILY MEDICINE CLINIC | Facility: CLINIC | Age: 68
End: 2023-01-12
Payer: COMMERCIAL

## 2023-01-12 VITALS
HEART RATE: 71 BPM | SYSTOLIC BLOOD PRESSURE: 138 MMHG | HEIGHT: 73 IN | OXYGEN SATURATION: 97 % | DIASTOLIC BLOOD PRESSURE: 78 MMHG | BODY MASS INDEX: 27.3 KG/M2 | WEIGHT: 206 LBS

## 2023-01-12 DIAGNOSIS — Z12.5 SCREENING PSA (PROSTATE SPECIFIC ANTIGEN): ICD-10-CM

## 2023-01-12 DIAGNOSIS — Z00.00 ANNUAL PHYSICAL EXAM: Primary | ICD-10-CM

## 2023-01-12 PROCEDURE — 99397 PER PM REEVAL EST PAT 65+ YR: CPT | Performed by: INTERNAL MEDICINE

## 2023-01-12 RX ORDER — MULTIVIT-MIN/IRON/FOLIC ACID/K 18-600-40
CAPSULE ORAL DAILY
COMMUNITY

## 2023-01-12 NOTE — PROGRESS NOTES
Subjective Chief complaint is annual exam  Hector Kam is a 67 y.o. male.     History of Present Illness Joe is here today for his annual exam.  He basically has been feeling well.  Gained most of his stamina back after having a pulmonary embolism.  He is up to speed on most screening exams.  We did discuss his pneumococcal vaccines.  He had a Pneumovax a few years ago.  I think he is probably okay and does not necessarily need the Prevnar at this point in time.  He will be due for his colonoscopy at the end of this year.  He is on an every 5-year plan.  I did advise him that if his surgeon does not contact him with an automated reminder then let me know.    The following portions of the patient's history were reviewed and updated as appropriate: allergies, current medications, past family history, past medical history, past social history, past surgical history and problem list.    Review of Systems   Constitutional: Negative for chills, fatigue and fever.   HENT: Positive for tinnitus. Negative for congestion, ear pain, hearing loss, nosebleeds, sore throat, swollen glands and trouble swallowing.    Eyes: Negative for blurred vision, double vision and visual disturbance.   Respiratory: Negative for cough, chest tightness and shortness of breath.    Cardiovascular: Negative for chest pain.        He occasionally has some swelling in his feet that he has to loosen his shoes.  The swelling goes down overnight.   Gastrointestinal: Negative for abdominal pain, anal bleeding, blood in stool, constipation, diarrhea, GERD and indigestion.   Genitourinary: Positive for nocturia. Negative for dysuria, flank pain and hematuria.        He occasionally will have an episode of getting up once at night to go to the bathroom.   Neurological: Positive for numbness. Negative for dizziness, weakness, light-headedness and headache.        He did have some numbness in his left arm after a vaccine approximately a year ago.  That  seemed to have resolved.   Hematological: Does not bruise/bleed easily.       Objective   Physical Exam  Vitals and nursing note reviewed.   Constitutional:       Appearance: Normal appearance. He is well-developed.   HENT:      Head: Normocephalic and atraumatic.      Right Ear: Tympanic membrane, ear canal and external ear normal.      Left Ear: Tympanic membrane, ear canal and external ear normal.      Nose: Nose normal.      Mouth/Throat:      Mouth: Mucous membranes are moist.      Pharynx: Oropharynx is clear. No oropharyngeal exudate or posterior oropharyngeal erythema.   Eyes:      General: No scleral icterus.     Conjunctiva/sclera: Conjunctivae normal.      Pupils: Pupils are equal, round, and reactive to light.   Neck:      Thyroid: No thyromegaly.      Vascular: No JVD.      Trachea: No tracheal deviation.   Cardiovascular:      Rate and Rhythm: Normal rate and regular rhythm.      Pulses: Normal pulses.      Heart sounds: Normal heart sounds. No murmur heard.    No friction rub. No gallop.   Pulmonary:      Effort: Pulmonary effort is normal. No respiratory distress.      Breath sounds: Normal breath sounds. No wheezing or rales.   Abdominal:      General: Bowel sounds are normal. There is no distension.      Palpations: Abdomen is soft. There is no mass.      Tenderness: There is no abdominal tenderness. There is no guarding or rebound.      Hernia: No hernia is present.   Musculoskeletal:         General: Normal range of motion.      Cervical back: Neck supple.   Lymphadenopathy:      Cervical: No cervical adenopathy.   Skin:     General: Skin is warm and dry.   Neurological:      Mental Status: He is alert.      Cranial Nerves: No cranial nerve deficit.      Coordination: Coordination normal.      Deep Tendon Reflexes: Reflexes are normal and symmetric.   Psychiatric:         Mood and Affect: Mood normal.         Behavior: Behavior normal.           Assessment & Plan   Diagnoses and all orders for  this visit:    1. Annual physical exam (Primary)  -     Comprehensive Metabolic Panel  -     Hemoglobin A1c  -     Lipid Panel  -     PSA Screen  -     TSH+Free T4  -     CBC & Differential    2. Screening PSA (prostate specific antigen)  -     PSA Screen    Joe is here today for his annual exam.  All in all things are doing well.  We are going to do some laboratories today.  We did advise of his need for colonoscopy at the end of this year and if he does not receive an automated reminder from his surgeon then he should contact us.

## 2023-01-13 LAB
ALBUMIN SERPL-MCNC: 4.5 G/DL (ref 3.5–5.2)
ALBUMIN/GLOB SERPL: 1.7 G/DL
ALP SERPL-CCNC: 96 U/L (ref 39–117)
ALT SERPL-CCNC: 19 U/L (ref 1–41)
AST SERPL-CCNC: 25 U/L (ref 1–40)
BASOPHILS # BLD AUTO: 0.02 10*3/MM3 (ref 0–0.2)
BASOPHILS NFR BLD AUTO: 0.4 % (ref 0–1.5)
BILIRUB SERPL-MCNC: 0.7 MG/DL (ref 0–1.2)
BUN SERPL-MCNC: 13 MG/DL (ref 8–23)
BUN/CREAT SERPL: 14.6 (ref 7–25)
CALCIUM SERPL-MCNC: 9.4 MG/DL (ref 8.6–10.5)
CHLORIDE SERPL-SCNC: 104 MMOL/L (ref 98–107)
CHOLEST SERPL-MCNC: 238 MG/DL (ref 0–200)
CO2 SERPL-SCNC: 27.3 MMOL/L (ref 22–29)
CREAT SERPL-MCNC: 0.89 MG/DL (ref 0.76–1.27)
EGFRCR SERPLBLD CKD-EPI 2021: 93.9 ML/MIN/1.73
EOSINOPHIL # BLD AUTO: 0.05 10*3/MM3 (ref 0–0.4)
EOSINOPHIL NFR BLD AUTO: 0.9 % (ref 0.3–6.2)
ERYTHROCYTE [DISTWIDTH] IN BLOOD BY AUTOMATED COUNT: 12.7 % (ref 12.3–15.4)
GLOBULIN SER CALC-MCNC: 2.6 GM/DL
GLUCOSE SERPL-MCNC: 88 MG/DL (ref 65–99)
HBA1C MFR BLD: 5.4 % (ref 4.8–5.6)
HCT VFR BLD AUTO: 44.1 % (ref 37.5–51)
HDLC SERPL-MCNC: 75 MG/DL (ref 40–60)
HGB BLD-MCNC: 15.6 G/DL (ref 13–17.7)
IMM GRANULOCYTES # BLD AUTO: 0.01 10*3/MM3 (ref 0–0.05)
IMM GRANULOCYTES NFR BLD AUTO: 0.2 % (ref 0–0.5)
LDLC SERPL CALC-MCNC: 150 MG/DL (ref 0–100)
LYMPHOCYTES # BLD AUTO: 1.06 10*3/MM3 (ref 0.7–3.1)
LYMPHOCYTES NFR BLD AUTO: 20.1 % (ref 19.6–45.3)
MCH RBC QN AUTO: 31.3 PG (ref 26.6–33)
MCHC RBC AUTO-ENTMCNC: 35.4 G/DL (ref 31.5–35.7)
MCV RBC AUTO: 88.6 FL (ref 79–97)
MONOCYTES # BLD AUTO: 0.49 10*3/MM3 (ref 0.1–0.9)
MONOCYTES NFR BLD AUTO: 9.3 % (ref 5–12)
NEUTROPHILS # BLD AUTO: 3.64 10*3/MM3 (ref 1.7–7)
NEUTROPHILS NFR BLD AUTO: 69.1 % (ref 42.7–76)
NRBC BLD AUTO-RTO: 0 /100 WBC (ref 0–0.2)
PLATELET # BLD AUTO: 284 10*3/MM3 (ref 140–450)
POTASSIUM SERPL-SCNC: 4.8 MMOL/L (ref 3.5–5.2)
PROT SERPL-MCNC: 7.1 G/DL (ref 6–8.5)
PSA SERPL-MCNC: 1.96 NG/ML (ref 0–4)
RBC # BLD AUTO: 4.98 10*6/MM3 (ref 4.14–5.8)
SODIUM SERPL-SCNC: 141 MMOL/L (ref 136–145)
T4 FREE SERPL-MCNC: 1.18 NG/DL (ref 0.93–1.7)
TRIGL SERPL-MCNC: 76 MG/DL (ref 0–150)
TSH SERPL DL<=0.005 MIU/L-ACNC: 1.69 UIU/ML (ref 0.27–4.2)
VLDLC SERPL CALC-MCNC: 13 MG/DL (ref 5–40)
WBC # BLD AUTO: 5.27 10*3/MM3 (ref 3.4–10.8)

## 2024-02-09 ENCOUNTER — OFFICE VISIT (OUTPATIENT)
Dept: FAMILY MEDICINE CLINIC | Facility: CLINIC | Age: 69
End: 2024-02-09
Payer: COMMERCIAL

## 2024-02-09 VITALS
HEART RATE: 70 BPM | SYSTOLIC BLOOD PRESSURE: 140 MMHG | HEIGHT: 73 IN | DIASTOLIC BLOOD PRESSURE: 82 MMHG | OXYGEN SATURATION: 98 % | RESPIRATION RATE: 16 BRPM | WEIGHT: 213 LBS | BODY MASS INDEX: 28.23 KG/M2

## 2024-02-09 DIAGNOSIS — Z00.00 ANNUAL PHYSICAL EXAM: Primary | ICD-10-CM

## 2024-02-09 DIAGNOSIS — Z12.5 SCREENING PSA (PROSTATE SPECIFIC ANTIGEN): ICD-10-CM

## 2024-02-09 PROCEDURE — 99397 PER PM REEVAL EST PAT 65+ YR: CPT | Performed by: INTERNAL MEDICINE

## 2024-02-09 NOTE — PROGRESS NOTES
Subjective chief complaint is annual exam  Hector Kam is a 68 y.o. male.     History of Present Illness Joe is here today for an annual exam.  He basically has been doing well.  His blood pressures have been a little bit elevated when he is at the [employer].  His diastolic number seems to be the bigger issue.  He did have an echocardiogram done in 2020 and there was no ventricular wall thickness at that time.  There is no dilatation of either ventricle.  He did have an episode of near syncope with after flying to [place].  He did not have chest pain or heart palpitations or anything prior to that.  Had no further episodes since.  He did have some lightheadedness that then lasted for a few months after that but that seems to have resolved as well.    The following portions of the patient's history were reviewed and updated as appropriate: He  has a past medical history of Acute deep vein thrombosis (DVT) of lower extremity (9/15/2020), Acute pulmonary embolism without acute cor pulmonale (9/15/2020), Cervical disc disease, DVT, lower extremity, Lower leg DVT (deep venous thromboembolism), chronic, left (4/6/2021), Pulmonary embolism (2020), and Pulmonary infarct (9/17/2020).  He does not have any pertinent problems on file.  He  has a past surgical history that includes Colonoscopy and Excision basal cell carcinoma.  His family history includes Alcohol abuse in his father; Cancer in his father; Prostate cancer in his paternal grandfather; Thyroid disease in his sister.  He  reports that he quit smoking about 45 years ago. His smoking use included cigarettes. He started smoking about 50 years ago. He has a 5.00 pack-year smoking history. He has never used smokeless tobacco. He reports current alcohol use of about 6.0 standard drinks of alcohol per week. He reports that he does not use drugs.  Current Outpatient Medications   Medication Sig Dispense Refill   • aspirin 81 MG EC tablet Take 1 tablet by mouth Daily.      • Vitamin D, Cholecalciferol, 50 MCG (2000 UT) capsule Take  by mouth Daily.       No current facility-administered medications for this visit.     Current Outpatient Medications on File Prior to Visit   Medication Sig   • aspirin 81 MG EC tablet Take 1 tablet by mouth Daily.   • Vitamin D, Cholecalciferol, 50 MCG (2000 UT) capsule Take  by mouth Daily.     No current facility-administered medications on file prior to visit.     He has No Known Allergies..    Review of Systems   Constitutional:  Negative for chills and fever.   HENT:  Positive for tinnitus. Negative for congestion, ear pain, hearing loss, nosebleeds, sore throat and trouble swallowing.    Eyes:  Negative for blurred vision, double vision and visual disturbance.   Respiratory:  Negative for cough, chest tightness and shortness of breath.    Cardiovascular:  Negative for chest pain, palpitations and leg swelling.   Gastrointestinal:  Negative for abdominal pain, blood in stool, constipation, diarrhea, GERD and indigestion.   Genitourinary:  Positive for nocturia. Negative for dysuria and hematuria.   Musculoskeletal:  Positive for myalgias.   Neurological:  Negative for dizziness, weakness, light-headedness, numbness and headache.   Hematological:  Does not bruise/bleed easily.   Psychiatric/Behavioral:  Negative for sleep disturbance.      Objective   Physical Exam  Vitals and nursing note reviewed.   Constitutional:       Appearance: Normal appearance.   HENT:      Right Ear: Tympanic membrane and ear canal normal.      Left Ear: Tympanic membrane normal.      Ears:      Comments: There is mild cerumen in the left external auditory canal but the tympanic membrane is still visualized.     Nose: Congestion present.      Mouth/Throat:      Mouth: Mucous membranes are moist.      Pharynx: Oropharynx is clear. No oropharyngeal exudate or posterior oropharyngeal erythema.   Eyes:      General: No scleral icterus.     Extraocular Movements: Extraocular  movements intact.      Conjunctiva/sclera: Conjunctivae normal.      Pupils: Pupils are equal, round, and reactive to light.   Neck:      Thyroid: No thyroid mass or thyromegaly.      Vascular: No carotid bruit.   Cardiovascular:      Rate and Rhythm: Normal rate and regular rhythm.      Pulses: Normal pulses.      Heart sounds: No murmur heard.     No friction rub. No gallop.   Pulmonary:      Effort: Pulmonary effort is normal.      Breath sounds: No wheezing, rhonchi or rales.   Abdominal:      General: Bowel sounds are normal.      Palpations: Abdomen is soft.      Tenderness: There is no abdominal tenderness. There is no guarding or rebound.   Musculoskeletal:         General: Normal range of motion.      Right lower leg: No edema.      Left lower leg: No edema.   Lymphadenopathy:      Cervical: No cervical adenopathy.   Skin:     General: Skin is warm and dry.   Neurological:      General: No focal deficit present.      Mental Status: He is alert.      Cranial Nerves: No cranial nerve deficit.      Coordination: Coordination normal.      Comments: I am unable to elicit patellar reflexes.   Psychiatric:         Mood and Affect: Mood normal.         Behavior: Behavior normal.       Assessment & Plan   Diagnoses and all orders for this visit:    1. Annual physical exam (Primary)  -     CBC & Differential  -     Comprehensive Metabolic Panel  -     Hemoglobin A1c  -     Lipid Panel  -     TSH+Free T4  -     PSA Screen    2. BMI 28.0-28.9,adult    3. Screening PSA (prostate specific antigen)  -     PSA Screen    Joe is here today for his annual physical exam.  He is up to speed on most things.  We did discuss some vaccines and he is going to think about those.  We did discuss his body mass index.  He is going to try to work on losing some weight.  We discussed his slight elevation in diastolic pressures.  We are going to repeat that in 6 months.Body mass index is 28.1 kg/m².BMI is >= 25 and <30. (Overweight) The  following options were offered after discussion;: weight loss educational material (shared in after visit summary) and exercise counseling/recommendations

## 2024-02-10 LAB
ALBUMIN SERPL-MCNC: 4.1 G/DL (ref 3.5–5.2)
ALBUMIN/GLOB SERPL: 1.6 G/DL
ALP SERPL-CCNC: 104 U/L (ref 39–117)
ALT SERPL-CCNC: 18 U/L (ref 1–41)
AST SERPL-CCNC: 19 U/L (ref 1–40)
BASOPHILS # BLD AUTO: 0.03 10*3/MM3 (ref 0–0.2)
BASOPHILS NFR BLD AUTO: 0.7 % (ref 0–1.5)
BILIRUB SERPL-MCNC: 0.5 MG/DL (ref 0–1.2)
BUN SERPL-MCNC: 12 MG/DL (ref 8–23)
BUN/CREAT SERPL: 13 (ref 7–25)
CALCIUM SERPL-MCNC: 9 MG/DL (ref 8.6–10.5)
CHLORIDE SERPL-SCNC: 106 MMOL/L (ref 98–107)
CHOLEST SERPL-MCNC: 171 MG/DL (ref 0–200)
CO2 SERPL-SCNC: 24.8 MMOL/L (ref 22–29)
CREAT SERPL-MCNC: 0.92 MG/DL (ref 0.76–1.27)
EGFRCR SERPLBLD CKD-EPI 2021: 90.6 ML/MIN/1.73
EOSINOPHIL # BLD AUTO: 0.06 10*3/MM3 (ref 0–0.4)
EOSINOPHIL NFR BLD AUTO: 1.4 % (ref 0.3–6.2)
ERYTHROCYTE [DISTWIDTH] IN BLOOD BY AUTOMATED COUNT: 12.8 % (ref 12.3–15.4)
GLOBULIN SER CALC-MCNC: 2.5 GM/DL
GLUCOSE SERPL-MCNC: 92 MG/DL (ref 65–99)
HBA1C MFR BLD: 5.8 % (ref 4.8–5.6)
HCT VFR BLD AUTO: 45 % (ref 37.5–51)
HDLC SERPL-MCNC: 53 MG/DL (ref 40–60)
HGB BLD-MCNC: 15.2 G/DL (ref 13–17.7)
IMM GRANULOCYTES # BLD AUTO: 0.01 10*3/MM3 (ref 0–0.05)
IMM GRANULOCYTES NFR BLD AUTO: 0.2 % (ref 0–0.5)
LDLC SERPL CALC-MCNC: 100 MG/DL (ref 0–100)
LYMPHOCYTES # BLD AUTO: 1.02 10*3/MM3 (ref 0.7–3.1)
LYMPHOCYTES NFR BLD AUTO: 23.5 % (ref 19.6–45.3)
MCH RBC QN AUTO: 31 PG (ref 26.6–33)
MCHC RBC AUTO-ENTMCNC: 33.8 G/DL (ref 31.5–35.7)
MCV RBC AUTO: 91.8 FL (ref 79–97)
MONOCYTES # BLD AUTO: 0.47 10*3/MM3 (ref 0.1–0.9)
MONOCYTES NFR BLD AUTO: 10.8 % (ref 5–12)
NEUTROPHILS # BLD AUTO: 2.75 10*3/MM3 (ref 1.7–7)
NEUTROPHILS NFR BLD AUTO: 63.4 % (ref 42.7–76)
NRBC BLD AUTO-RTO: 0 /100 WBC (ref 0–0.2)
PLATELET # BLD AUTO: 178 10*3/MM3 (ref 140–450)
POTASSIUM SERPL-SCNC: 4.6 MMOL/L (ref 3.5–5.2)
PROT SERPL-MCNC: 6.6 G/DL (ref 6–8.5)
PSA SERPL-MCNC: 1.65 NG/ML (ref 0–4)
RBC # BLD AUTO: 4.9 10*6/MM3 (ref 4.14–5.8)
SODIUM SERPL-SCNC: 141 MMOL/L (ref 136–145)
T4 FREE SERPL-MCNC: 1.2 NG/DL (ref 0.93–1.7)
TRIGL SERPL-MCNC: 96 MG/DL (ref 0–150)
TSH SERPL DL<=0.005 MIU/L-ACNC: 1.4 UIU/ML (ref 0.27–4.2)
VLDLC SERPL CALC-MCNC: 18 MG/DL (ref 5–40)
WBC # BLD AUTO: 4.34 10*3/MM3 (ref 3.4–10.8)

## 2024-10-17 ENCOUNTER — OFFICE VISIT (OUTPATIENT)
Dept: FAMILY MEDICINE CLINIC | Facility: CLINIC | Age: 69
End: 2024-10-17
Payer: COMMERCIAL

## 2024-10-17 VITALS
OXYGEN SATURATION: 98 % | HEART RATE: 71 BPM | BODY MASS INDEX: 28.49 KG/M2 | WEIGHT: 215 LBS | DIASTOLIC BLOOD PRESSURE: 84 MMHG | RESPIRATION RATE: 16 BRPM | SYSTOLIC BLOOD PRESSURE: 136 MMHG | HEIGHT: 73 IN

## 2024-10-17 DIAGNOSIS — R03.0 ELEVATED BLOOD PRESSURE READING WITHOUT DIAGNOSIS OF HYPERTENSION: Primary | ICD-10-CM

## 2024-10-17 PROCEDURE — 99213 OFFICE O/P EST LOW 20 MIN: CPT | Performed by: INTERNAL MEDICINE

## 2024-10-17 NOTE — PROGRESS NOTES
Answers submitted by the patient for this visit:  Primary Reason for Visit (Submitted on 10/10/2024)  What is the primary reason for your visit?: High Blood Pressure  Subjective chief complaint is elevated blood pressures  Hector Kam is a 69 y.o. male.     History of Present Illness Joe is here today for elevations in his blood pressure.  He has been donating blood fairly routinely.  The last several times his blood pressure has been elevated.  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  His diet really has not changed much.  They may be eating out a little bit more than usual.  He certainly is not exercising to the degree that he was prior to the pandemic.  He did stop drinking coffee but does drink tea.  He had an echocardiogram done in 2020 at the time of a pulmonary embolism.  There was no thickening of the heart muscle at that time  The following portions of the patient's history were reviewed and updated as appropriate: allergies, current medications, and problem list.    Review of Systems   Respiratory:  Negative for shortness of breath.    Cardiovascular:  Negative for chest pain and palpitations.   Neurological:  Negative for headaches.       Objective   Physical Exam  Vitals and nursing note reviewed.   Constitutional:       Appearance: Normal appearance.   Neck:      Vascular: No carotid bruit.   Cardiovascular:      Rate and Rhythm: Normal rate and regular rhythm.      Heart sounds: No murmur heard.     No friction rub. No gallop.      Comments: Blood pressure to my exam is 130/86  Pulmonary:      Effort: Pulmonary effort is normal.   Abdominal:      General: Bowel sounds are normal.      Palpations: Abdomen is soft.   Neurological:      Mental Status: He is alert.         Assessment & Plan   Diagnoses and all orders for this visit:    1. Elevated blood pressure reading without  diagnosis of hypertension (Primary)    Joe is here today for elevations in his blood pressure.  We did discuss trying to lose approximately 5 to 10% of his body weight.  We did discuss cutting back a little bit on his tea intake.  We advise watching salt in diet in terms of eating out.  We did discuss the DASH diet plan and have given him a copy of that.  We will see him back in 3 months.  We did advise checking his blood pressure once weekly.  We will then have him bring his monitor with him when he comes to the office and we will do a [id number] comparison

## 2025-01-17 ENCOUNTER — OFFICE VISIT (OUTPATIENT)
Dept: FAMILY MEDICINE CLINIC | Facility: CLINIC | Age: 70
End: 2025-01-17
Payer: COMMERCIAL

## 2025-01-17 VITALS
WEIGHT: 223 LBS | HEART RATE: 78 BPM | SYSTOLIC BLOOD PRESSURE: 142 MMHG | RESPIRATION RATE: 16 BRPM | HEIGHT: 73 IN | OXYGEN SATURATION: 98 % | BODY MASS INDEX: 29.55 KG/M2 | DIASTOLIC BLOOD PRESSURE: 88 MMHG

## 2025-01-17 DIAGNOSIS — I10 ESSENTIAL (PRIMARY) HYPERTENSION: Primary | ICD-10-CM

## 2025-01-17 PROCEDURE — 99213 OFFICE O/P EST LOW 20 MIN: CPT | Performed by: INTERNAL MEDICINE

## 2025-01-17 RX ORDER — VALSARTAN 80 MG/1
80 TABLET ORAL DAILY
Qty: 30 TABLET | Refills: 5 | Status: SHIPPED | OUTPATIENT
Start: 2025-01-17

## 2025-01-17 NOTE — PROGRESS NOTES
Answers submitted by the patient for this visit:  Primary Reason for Visit (Submitted on 1/16/2025)  What is the primary reason for your visit?: High Blood Pressure  Subjective   Hector Kam is a 69 y.o. male.     History of Present Illness chief complaint is follow-up on high blood pressure  Joe is here today for follow-up on his high blood pressure.  Since his last visit he has been tracking his blood pressures.  He has been taking it at various times during the day.  He also has had taken several times while donating blood.  The blood donation visit seem to have higher systolics and a little bit better diastolic readings.  He does report that his arm position is different when those are taken.  He also reports that they generally take it through his close.  His readings at home have fairly decent systolic readings at times but his diastolic readings are consistently elevated.  Unfortunately since his last visit he has not lost any weight and has actually gained some weight through the holidays.  He still seems to be exercising to a good degree.  I did take his blood pressure at 136/92 in the left arm.  We immediately put his cuff on we got 148/93  The following portions of the patient's history were reviewed and updated as appropriate: allergies, current medications, and problem list.    Review of Systems   Respiratory:  Negative for shortness of breath.    Cardiovascular:  Negative for chest pain and palpitations.   Neurological:  Negative for headaches.       Objective   Physical Exam  Vitals and nursing note reviewed.   Constitutional:       Appearance: Normal appearance.   Cardiovascular:      Rate and Rhythm: Normal rate.   Pulmonary:      Effort: Pulmonary effort is normal.   Neurological:      Mental Status: He is alert.       Assessment & Plan   Diagnoses and all orders for this visit:    1. Essential (primary) hypertension (Primary)    Joe is here today for follow-up.  His diastolic pressures  remain elevated.  We are going to initiate some valsartan.  We are going to see him back in a month.  He is going to continue to monitor his pressures at home.  We did advise that he may experiencing some lightheadedness initially when taking this medicine.  We did advise of the rare possibility of angioedema or urticaria.  We also discussed possible cough as a side effect.

## 2025-02-14 ENCOUNTER — OFFICE VISIT (OUTPATIENT)
Dept: FAMILY MEDICINE CLINIC | Facility: CLINIC | Age: 70
End: 2025-02-14
Payer: COMMERCIAL

## 2025-02-14 VITALS
SYSTOLIC BLOOD PRESSURE: 112 MMHG | WEIGHT: 221.6 LBS | DIASTOLIC BLOOD PRESSURE: 78 MMHG | RESPIRATION RATE: 16 BRPM | HEART RATE: 85 BPM | OXYGEN SATURATION: 99 % | HEIGHT: 73 IN | BODY MASS INDEX: 29.37 KG/M2

## 2025-02-14 DIAGNOSIS — I10 ESSENTIAL (PRIMARY) HYPERTENSION: Primary | ICD-10-CM

## 2025-02-14 DIAGNOSIS — Z23 IMMUNIZATION DUE: ICD-10-CM

## 2025-02-14 PROCEDURE — 90662 IIV NO PRSV INCREASED AG IM: CPT | Performed by: INTERNAL MEDICINE

## 2025-02-14 PROCEDURE — 90471 IMMUNIZATION ADMIN: CPT | Performed by: INTERNAL MEDICINE

## 2025-02-14 PROCEDURE — 99213 OFFICE O/P EST LOW 20 MIN: CPT | Performed by: INTERNAL MEDICINE

## 2025-02-14 NOTE — PROGRESS NOTES
Subjective chief complaint is follow-up on high blood pressure  Hector Kam is a 69 y.o. male.  Joe is here today for follow-up on his high blood pressure.  Approximately 1 month ago we did institute valsartan therapy at 80 mg.  He has been monitoring his blood pressures at home.  Approximately 2 weeks ago it does show a fairly significant change and improvement.  I did repeat his blood pressure today at 118/68    Hypertension  The current episode started more than 1 month ago. Pertinent negatives include no anxiety, blurred vision, chest pain, headaches, malaise/fatigue, orthopnea, palpitations or shortness of breath. Agents associated with hypertension include NSAIDs. There are no compliance problems.       The following portions of the patient's history were reviewed and updated as appropriate: He  has a past medical history of Acute deep vein thrombosis (DVT) of lower extremity (9/15/2020), Acute pulmonary embolism without acute cor pulmonale (9/15/2020), Cervical disc disease, DVT, lower extremity, Lower leg DVT (deep venous thromboembolism), chronic, left (4/6/2021), Pulmonary embolism (2020), and Pulmonary infarct (9/17/2020).  He does not have any pertinent problems on file.  Current Outpatient Medications   Medication Sig Dispense Refill    aspirin 81 MG EC tablet Take 1 tablet by mouth Daily.      valsartan (Diovan) 80 MG tablet Take 1 tablet by mouth Daily. 30 tablet 5    Vitamin D, Cholecalciferol, 50 MCG (2000 UT) capsule Take  by mouth Daily.       No current facility-administered medications for this visit.     He has No Known Allergies..    Review of Systems   Constitutional:  Negative for malaise/fatigue.   Eyes:  Negative for blurred vision.   Respiratory:  Negative for shortness of breath.    Cardiovascular:  Negative for chest pain, palpitations and orthopnea.   Neurological:  Negative for headaches.     Objective   Physical Exam  Constitutional:       Appearance: Normal appearance.    Cardiovascular:      Rate and Rhythm: Normal rate.      Comments: Blood pressure to my exam was 118/68 in the left arm  Pulmonary:      Effort: Pulmonary effort is normal.   Neurological:      Mental Status: He is alert.     Assessment & Plan   Diagnoses and all orders for this visit:    1. Essential (primary) hypertension (Primary)    Joe is here today for follow-up.  Blood pressure is certainly doing better.  He is tolerating the medicine without any significant side effects.  He is going to try to set up a physical exam and we will do some lab work

## 2025-03-19 RX ORDER — VALSARTAN 80 MG/1
80 TABLET ORAL DAILY
Qty: 30 TABLET | Refills: 5 | Status: SHIPPED | OUTPATIENT
Start: 2025-03-19

## 2025-07-29 RX ORDER — VALSARTAN 80 MG/1
80 TABLET ORAL DAILY
Qty: 30 TABLET | Refills: 5 | Status: SHIPPED | OUTPATIENT
Start: 2025-07-29